# Patient Record
Sex: MALE | Race: WHITE | NOT HISPANIC OR LATINO | Employment: UNEMPLOYED | URBAN - NONMETROPOLITAN AREA
[De-identification: names, ages, dates, MRNs, and addresses within clinical notes are randomized per-mention and may not be internally consistent; named-entity substitution may affect disease eponyms.]

---

## 2018-06-07 ENCOUNTER — OFFICE VISIT (OUTPATIENT)
Dept: URGENT CARE | Facility: PHYSICIAN GROUP | Age: 53
End: 2018-06-07

## 2018-06-07 VITALS
DIASTOLIC BLOOD PRESSURE: 80 MMHG | OXYGEN SATURATION: 97 % | HEIGHT: 73 IN | WEIGHT: 206 LBS | TEMPERATURE: 97.7 F | BODY MASS INDEX: 27.3 KG/M2 | SYSTOLIC BLOOD PRESSURE: 128 MMHG | RESPIRATION RATE: 14 BRPM | HEART RATE: 72 BPM

## 2018-06-07 DIAGNOSIS — H69.92 ACUTE DYSFUNCTION OF LEFT EUSTACHIAN TUBE: ICD-10-CM

## 2018-06-07 PROCEDURE — 99203 OFFICE O/P NEW LOW 30 MIN: CPT | Performed by: FAMILY MEDICINE

## 2018-06-07 RX ORDER — PREDNISONE 20 MG/1
TABLET ORAL
Qty: 14 TAB | Refills: 0 | Status: SHIPPED | OUTPATIENT
Start: 2018-06-07 | End: 2020-08-12

## 2018-06-07 NOTE — PROGRESS NOTES
Chief Complaint:    Chief Complaint   Patient presents with   • Otalgia       History of Present Illness:    This is a new problem. For 1 month, he has fluctuating left ear discomfort. Ear can feel stuffy and sometimes pops. Sometimes feels he can hear fluid in left ear. Currently in room, left ear discomfort is mild. Occl feels dizzy. Has had some mild nasal symptoms. Sometimes has watery eyes. No purulent mucus from nose. No sore throat or cough. Nothing tried for this. No similar prior episodes.       Review of Systems:    Constitutional: Negative for fever, chills, and diaphoresis.   Eyes: See HPI.    ENT: See HPI.    Respiratory: Negative for cough, hemoptysis, sputum production, shortness of breath, wheezing, and stridor.    Cardiovascular: Negative for chest pain, palpitations, orthopnea, claudication, leg swelling, and PND.   Gastrointestinal: Negative for abdominal pain, nausea, vomiting, diarrhea, constipation, blood in stool, and melena.   Musculoskeletal: Negative for myalgias, joint pain, neck pain, and back pain.   Skin: Negative for rash and itching.   Neurological: See HPI.        Past Medical History:    No past medical history on file.    Past Surgical History:    No past surgical history on file.    Social History:    Social History     Social History   • Marital status:      Spouse name: N/A   • Number of children: N/A   • Years of education: N/A     Occupational History   • Not on file.     Social History Main Topics   • Smoking status: Current Every Day Smoker   • Smokeless tobacco: Former User     Types: Chew   • Alcohol use Not on file   • Drug use: Unknown   • Sexual activity: Not on file     Other Topics Concern   • Not on file     Social History Narrative   • No narrative on file     Family History:    No family history on file.    Medications:    No current outpatient prescriptions on file prior to visit.     No current facility-administered medications on file prior to visit.   "    Allergies:    No Known Allergies      Vitals:    Vitals:    06/07/18 0930   BP: 128/80   Pulse: 72   Resp: 14   Temp: 36.5 °C (97.7 °F)   SpO2: 97%   Weight: 93.4 kg (206 lb)   Height: 1.854 m (6' 1\")       Physical Exam:    Constitutional: Vital signs reviewed. Appears well-developed and well-nourished. No acute distress.   Eyes: Sclera white, conjunctivae clear. PERRLA.  ENT: External ears normal. External auditory canals normal without discharge. TMs translucent and non-bulging. Hearing normal. Nasal mucosa pink. Lips/teeth are normal. Oral mucosa pink and moist. Posterior pharynx: WNL.  Cardiovascular: Regular rate and rhythm. No murmur.  Pulmonary/Chest: Respirations non-labored. Clear to auscultation bilaterally.  Musculoskeletal: Normal gait. Normal range of motion. No tenderness to palpation. No muscular atrophy or weakness.  Neurological: Alert and oriented to person, place, and time. CN 2-12 intact. Muscle tone normal. Coordination normal.   Skin: No rashes or lesions. Warm, dry, normal turgor.      Assessment / Plan:    1. Acute dysfunction of left eustachian tube  - predniSONE (DELTASONE) 20 MG Tab; 1 OR 2 TABS A DAY ONLY IF NEEDED FOR LEFT EAR DISCOMFORT. TAKE WITH FOOD.  Dispense: 14 Tab; Refill: 0      Discussed with him DDX and management options.    Agreeable to medication prescribed.    Follow-up with PCP or urgent care if getting worse or not better with above.  "

## 2020-04-04 ENCOUNTER — OFFICE VISIT (OUTPATIENT)
Dept: URGENT CARE | Facility: PHYSICIAN GROUP | Age: 55
End: 2020-04-04
Payer: MEDICAID

## 2020-04-04 VITALS
HEIGHT: 73 IN | BODY MASS INDEX: 25.18 KG/M2 | RESPIRATION RATE: 16 BRPM | HEART RATE: 79 BPM | SYSTOLIC BLOOD PRESSURE: 126 MMHG | DIASTOLIC BLOOD PRESSURE: 82 MMHG | OXYGEN SATURATION: 96 % | WEIGHT: 190 LBS | TEMPERATURE: 97.4 F

## 2020-04-04 DIAGNOSIS — K57.92 DIVERTICULITIS: ICD-10-CM

## 2020-04-04 PROCEDURE — 99214 OFFICE O/P EST MOD 30 MIN: CPT | Performed by: PHYSICIAN ASSISTANT

## 2020-04-04 RX ORDER — ERGOCALCIFEROL 1.25 MG/1
CAPSULE ORAL
COMMUNITY
Start: 2020-01-19 | End: 2020-08-12

## 2020-04-04 RX ORDER — CETIRIZINE HYDROCHLORIDE 10 MG/1
TABLET ORAL PRN
COMMUNITY
Start: 2020-03-27 | End: 2020-08-24

## 2020-04-04 RX ORDER — FLUTICASONE PROPIONATE 50 MCG
SPRAY, SUSPENSION (ML) NASAL
COMMUNITY
Start: 2020-03-27 | End: 2020-08-12

## 2020-04-04 RX ORDER — AMOXICILLIN AND CLAVULANATE POTASSIUM 875; 125 MG/1; MG/1
1 TABLET, FILM COATED ORAL 2 TIMES DAILY
Qty: 20 TAB | Refills: 0 | Status: SHIPPED | OUTPATIENT
Start: 2020-04-04 | End: 2020-04-14

## 2020-04-04 RX ORDER — METHYLPREDNISOLONE 4 MG/1
TABLET ORAL
COMMUNITY
Start: 2020-03-27 | End: 2020-08-12

## 2020-04-04 NOTE — PROGRESS NOTES
Chief Complaint   Patient presents with   • Abdominal Pain       HISTORY OF PRESENT ILLNESS: Patient is a 54 y.o. male who presents today because he feels he is having another bout of diverticulitis.  He has had diverticulitis in the past and this feels similar.  He has a 4-day history of diarrhea, worsening every day, has had some mucus in his stool and he has left lower quadrant pain.  He has not been taking any medications for his current symptoms    There are no active problems to display for this patient.      Allergies:Patient has no known allergies.    Current Outpatient Medications Ordered in Epic   Medication Sig Dispense Refill   • amoxicillin-clavulanate (AUGMENTIN) 875-125 MG Tab Take 1 Tab by mouth 2 times a day for 10 days. 20 Tab 0   • cetirizine (ZYRTEC) 10 MG Tab TAKE 1 TABLET BY MOUTH ONCE DAILY FOR 30 DAYS     • methylPREDNISolone (MEDROL DOSEPAK) 4 MG Tablet Therapy Pack TAKE BY MOUTH AS DIRECTED ON INSIDE OF PACKAGE     • fluticasone (FLONASE) 50 MCG/ACT nasal spray USE 2 SPRAY(S) IN EACH NOSTRIL IN THE MORNING FOR 30 DAYS     • vitamin D, Ergocalciferol, (DRISDOL) 1.25 MG (47025 UT) Cap capsule TAKE 1 CAPSULE BY MOUTH ONCE A WEEK     • predniSONE (DELTASONE) 20 MG Tab 1 OR 2 TABS A DAY ONLY IF NEEDED FOR LEFT EAR DISCOMFORT. TAKE WITH FOOD. (Patient not taking: Reported on 4/4/2020) 14 Tab 0     No current Epic-ordered facility-administered medications on file.        History reviewed. No pertinent past medical history.    Social History     Tobacco Use   • Smoking status: Current Every Day Smoker   • Smokeless tobacco: Former User     Types: Chew   Substance Use Topics   • Alcohol use: Not on file   • Drug use: Not on file       No family status information on file.   History reviewed. No pertinent family history.    ROS:  Review of Systems   Constitutional: Negative for fever, chills, weight loss and malaise/fatigue.   HENT: Negative for ear pain, nosebleeds, congestion, sore throat and neck  "pain.    Eyes: Negative for blurred vision.   Respiratory: Negative for cough, sputum production, shortness of breath and wheezing.    Cardiovascular: Negative for chest pain, palpitations, orthopnea and leg swelling.   Gastrointestinal: Negative for heartburn, nausea, vomiting and positive for diarrhea and left lower abdominal pain.   Genitourinary: Negative for dysuria, urgency and frequency.     Exam:  /82   Pulse 79   Temp 36.3 °C (97.4 °F) (Temporal)   Resp 16   Ht 1.854 m (6' 1\")   Wt 86.2 kg (190 lb)   SpO2 96%   General:  Well nourished, well developed male in NAD  Head:Normocephalic, atraumatic  Eyes: PERRLA, EOM within normal limits, no conjunctival injection, no scleral icterus, visual fields and acuity grossly intact.  Nose: Symmetrical without tenderness, no discharge.  Mouth: reasonable hygiene, no erythema exudates or tonsillar enlargement.  Neck: no masses, range of motion within normal limits, no tracheal deviation. No obvious thyroid enlargement.  Abdomen: Nondistended, bowel tones all 4 quadrants, soft, he has left lower quadrant tenderness to palpation, no other tenderness to palpation, no organomegaly, no rebound referred rebound tenderness, no Mcgowan's or McBurney's point tenderness.  Extremities: no clubbing, cyanosis, or edema.    Please note that this dictation was created using voice recognition software. I have made every reasonable attempt to correct obvious errors, but I expect that there are errors of grammar and possibly content that I did not discover before finalizing the note.    Assessment/Plan:  1. Diverticulitis  amoxicillin-clavulanate (AUGMENTIN) 875-125 MG Tab       Followup with primary care in the next 7-10 days if not significantly improving, return to the urgent care or go to the emergency room sooner for any worsening of symptoms.       "

## 2020-04-18 ENCOUNTER — SUPERVISING PHYSICIAN REVIEW (OUTPATIENT)
Dept: URGENT CARE | Facility: PHYSICIAN GROUP | Age: 55
End: 2020-04-18

## 2020-07-25 ENCOUNTER — APPOINTMENT (OUTPATIENT)
Dept: RADIOLOGY | Facility: MEDICAL CENTER | Age: 55
End: 2020-07-25
Attending: STUDENT IN AN ORGANIZED HEALTH CARE EDUCATION/TRAINING PROGRAM
Payer: MEDICAID

## 2020-07-25 ENCOUNTER — HOSPITAL ENCOUNTER (EMERGENCY)
Facility: MEDICAL CENTER | Age: 55
End: 2020-07-25
Attending: EMERGENCY MEDICINE
Payer: MEDICAID

## 2020-07-25 ENCOUNTER — OFFICE VISIT (OUTPATIENT)
Dept: URGENT CARE | Facility: PHYSICIAN GROUP | Age: 55
End: 2020-07-25
Payer: MEDICAID

## 2020-07-25 VITALS
HEART RATE: 96 BPM | DIASTOLIC BLOOD PRESSURE: 84 MMHG | OXYGEN SATURATION: 99 % | RESPIRATION RATE: 16 BRPM | HEIGHT: 73 IN | WEIGHT: 190 LBS | TEMPERATURE: 98.5 F | BODY MASS INDEX: 25.18 KG/M2 | SYSTOLIC BLOOD PRESSURE: 128 MMHG

## 2020-07-25 VITALS
SYSTOLIC BLOOD PRESSURE: 125 MMHG | TEMPERATURE: 98.5 F | HEIGHT: 73 IN | RESPIRATION RATE: 18 BRPM | HEART RATE: 61 BPM | DIASTOLIC BLOOD PRESSURE: 70 MMHG | BODY MASS INDEX: 25.33 KG/M2 | WEIGHT: 191.14 LBS | OXYGEN SATURATION: 96 %

## 2020-07-25 DIAGNOSIS — R42 LIGHTHEADEDNESS: ICD-10-CM

## 2020-07-25 DIAGNOSIS — R42 DIZZINESS: ICD-10-CM

## 2020-07-25 LAB
ALBUMIN SERPL BCP-MCNC: 4.6 G/DL (ref 3.2–4.9)
ALBUMIN/GLOB SERPL: 2.2 G/DL
ALP SERPL-CCNC: 69 U/L (ref 30–99)
ALT SERPL-CCNC: 19 U/L (ref 2–50)
ANION GAP SERPL CALC-SCNC: 12 MMOL/L (ref 7–16)
AST SERPL-CCNC: 23 U/L (ref 12–45)
BASOPHILS # BLD AUTO: 0.8 % (ref 0–1.8)
BASOPHILS # BLD: 0.04 K/UL (ref 0–0.12)
BILIRUB SERPL-MCNC: 0.4 MG/DL (ref 0.1–1.5)
BUN SERPL-MCNC: 14 MG/DL (ref 8–22)
CALCIUM SERPL-MCNC: 8.9 MG/DL (ref 8.5–10.5)
CHLORIDE SERPL-SCNC: 103 MMOL/L (ref 96–112)
CO2 SERPL-SCNC: 23 MMOL/L (ref 20–33)
CREAT SERPL-MCNC: 0.97 MG/DL (ref 0.5–1.4)
EKG IMPRESSION: NORMAL
EOSINOPHIL # BLD AUTO: 0.1 K/UL (ref 0–0.51)
EOSINOPHIL NFR BLD: 1.9 % (ref 0–6.9)
ERYTHROCYTE [DISTWIDTH] IN BLOOD BY AUTOMATED COUNT: 51.1 FL (ref 35.9–50)
GLOBULIN SER CALC-MCNC: 2.1 G/DL (ref 1.9–3.5)
GLUCOSE SERPL-MCNC: 68 MG/DL (ref 65–99)
HCT VFR BLD AUTO: 47.3 % (ref 42–52)
HGB BLD-MCNC: 15.6 G/DL (ref 14–18)
IMM GRANULOCYTES # BLD AUTO: 0.02 K/UL (ref 0–0.11)
IMM GRANULOCYTES NFR BLD AUTO: 0.4 % (ref 0–0.9)
LYMPHOCYTES # BLD AUTO: 1.41 K/UL (ref 1–4.8)
LYMPHOCYTES NFR BLD: 26.8 % (ref 22–41)
MCH RBC QN AUTO: 32 PG (ref 27–33)
MCHC RBC AUTO-ENTMCNC: 33 G/DL (ref 33.7–35.3)
MCV RBC AUTO: 96.9 FL (ref 81.4–97.8)
MONOCYTES # BLD AUTO: 0.43 K/UL (ref 0–0.85)
MONOCYTES NFR BLD AUTO: 8.2 % (ref 0–13.4)
NEUTROPHILS # BLD AUTO: 3.27 K/UL (ref 1.82–7.42)
NEUTROPHILS NFR BLD: 61.9 % (ref 44–72)
NRBC # BLD AUTO: 0 K/UL
NRBC BLD-RTO: 0 /100 WBC
PLATELET # BLD AUTO: 217 K/UL (ref 164–446)
PMV BLD AUTO: 9.9 FL (ref 9–12.9)
POTASSIUM SERPL-SCNC: 4.3 MMOL/L (ref 3.6–5.5)
PROT SERPL-MCNC: 6.7 G/DL (ref 6–8.2)
RBC # BLD AUTO: 4.88 M/UL (ref 4.7–6.1)
SODIUM SERPL-SCNC: 138 MMOL/L (ref 135–145)
WBC # BLD AUTO: 5.3 K/UL (ref 4.8–10.8)

## 2020-07-25 PROCEDURE — 99999 PR NO CHARGE: CPT | Performed by: NURSE PRACTITIONER

## 2020-07-25 PROCEDURE — 99284 EMERGENCY DEPT VISIT MOD MDM: CPT

## 2020-07-25 PROCEDURE — 93005 ELECTROCARDIOGRAM TRACING: CPT | Performed by: EMERGENCY MEDICINE

## 2020-07-25 PROCEDURE — 71045 X-RAY EXAM CHEST 1 VIEW: CPT

## 2020-07-25 PROCEDURE — 85025 COMPLETE CBC W/AUTO DIFF WBC: CPT

## 2020-07-25 PROCEDURE — 70450 CT HEAD/BRAIN W/O DYE: CPT

## 2020-07-25 PROCEDURE — 80053 COMPREHEN METABOLIC PANEL: CPT

## 2020-07-25 PROCEDURE — 93005 ELECTROCARDIOGRAM TRACING: CPT

## 2020-07-25 SDOH — HEALTH STABILITY: MENTAL HEALTH: HOW OFTEN DO YOU HAVE A DRINK CONTAINING ALCOHOL?: NEVER

## 2020-07-25 NOTE — PROGRESS NOTES
Patient presents with complaint of intermittent lightheadedness over the last week.  States he has noted an intermittently elevated blood pressure as well.  No chest pain or shortness of breath at this time.  Vital signs are within normal limits.  Based on patient's symptoms after discussion, he will go on to emergency room for further evaluation and higher level of care.
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
## 2020-07-26 NOTE — DISCHARGE INSTRUCTIONS
We could not find a dangerous cause of your symptoms.  Rest and drink plenty of fluids.  [facility] to check blood pressure when you are well.  Return for ill appearance, loss of consciousness especially if with severe headache, chest pain, palpitations or leg swelling.

## 2020-07-26 NOTE — ED NOTES
Pt discharged. Pt provided information about dizziness. Pt educated to follow-up w/ PCP and to return to the hospital with any worsening symptoms. Pt walked to the lobby with his wife.

## 2020-08-04 ENCOUNTER — APPOINTMENT (OUTPATIENT)
Dept: RADIOLOGY | Facility: MEDICAL CENTER | Age: 55
End: 2020-08-04
Attending: EMERGENCY MEDICINE
Payer: MEDICAID

## 2020-08-04 ENCOUNTER — HOSPITAL ENCOUNTER (EMERGENCY)
Facility: MEDICAL CENTER | Age: 55
End: 2020-08-04
Attending: EMERGENCY MEDICINE
Payer: MEDICAID

## 2020-08-04 VITALS
OXYGEN SATURATION: 97 % | DIASTOLIC BLOOD PRESSURE: 89 MMHG | RESPIRATION RATE: 19 BRPM | BODY MASS INDEX: 25.3 KG/M2 | TEMPERATURE: 98.8 F | HEART RATE: 67 BPM | WEIGHT: 190.92 LBS | HEIGHT: 73 IN | SYSTOLIC BLOOD PRESSURE: 164 MMHG

## 2020-08-04 DIAGNOSIS — R42 LIGHTHEADEDNESS: ICD-10-CM

## 2020-08-04 DIAGNOSIS — I67.1 CEREBRAL ANEURYSM: ICD-10-CM

## 2020-08-04 LAB
ALBUMIN SERPL BCP-MCNC: 4.6 G/DL (ref 3.2–4.9)
ALBUMIN/GLOB SERPL: 2.3 G/DL
ALP SERPL-CCNC: 71 U/L (ref 30–99)
ALT SERPL-CCNC: 17 U/L (ref 2–50)
ANION GAP SERPL CALC-SCNC: 12 MMOL/L (ref 7–16)
AST SERPL-CCNC: 13 U/L (ref 12–45)
BASOPHILS # BLD AUTO: 0.4 % (ref 0–1.8)
BASOPHILS # BLD: 0.03 K/UL (ref 0–0.12)
BILIRUB SERPL-MCNC: 0.4 MG/DL (ref 0.1–1.5)
BUN SERPL-MCNC: 9 MG/DL (ref 8–22)
CALCIUM SERPL-MCNC: 9 MG/DL (ref 8.5–10.5)
CHLORIDE SERPL-SCNC: 105 MMOL/L (ref 96–112)
CO2 SERPL-SCNC: 25 MMOL/L (ref 20–33)
CREAT SERPL-MCNC: 0.94 MG/DL (ref 0.5–1.4)
EKG IMPRESSION: NORMAL
EOSINOPHIL # BLD AUTO: 0.12 K/UL (ref 0–0.51)
EOSINOPHIL NFR BLD: 1.5 % (ref 0–6.9)
ERYTHROCYTE [DISTWIDTH] IN BLOOD BY AUTOMATED COUNT: 50.5 FL (ref 35.9–50)
GLOBULIN SER CALC-MCNC: 2 G/DL (ref 1.9–3.5)
GLUCOSE SERPL-MCNC: 102 MG/DL (ref 65–99)
HCT VFR BLD AUTO: 46.1 % (ref 42–52)
HGB BLD-MCNC: 15.3 G/DL (ref 14–18)
IMM GRANULOCYTES # BLD AUTO: 0.02 K/UL (ref 0–0.11)
IMM GRANULOCYTES NFR BLD AUTO: 0.3 % (ref 0–0.9)
LYMPHOCYTES # BLD AUTO: 1.64 K/UL (ref 1–4.8)
LYMPHOCYTES NFR BLD: 20.8 % (ref 22–41)
MCH RBC QN AUTO: 32.2 PG (ref 27–33)
MCHC RBC AUTO-ENTMCNC: 33.2 G/DL (ref 33.7–35.3)
MCV RBC AUTO: 97.1 FL (ref 81.4–97.8)
MONOCYTES # BLD AUTO: 0.59 K/UL (ref 0–0.85)
MONOCYTES NFR BLD AUTO: 7.5 % (ref 0–13.4)
NEUTROPHILS # BLD AUTO: 5.48 K/UL (ref 1.82–7.42)
NEUTROPHILS NFR BLD: 69.5 % (ref 44–72)
NRBC # BLD AUTO: 0 K/UL
NRBC BLD-RTO: 0 /100 WBC
PLATELET # BLD AUTO: 209 K/UL (ref 164–446)
PMV BLD AUTO: 9.7 FL (ref 9–12.9)
POTASSIUM SERPL-SCNC: 3.8 MMOL/L (ref 3.6–5.5)
PROT SERPL-MCNC: 6.6 G/DL (ref 6–8.2)
RBC # BLD AUTO: 4.75 M/UL (ref 4.7–6.1)
SODIUM SERPL-SCNC: 142 MMOL/L (ref 135–145)
T4 FREE SERPL-MCNC: 1.42 NG/DL (ref 0.93–1.7)
TROPONIN T SERPL-MCNC: 7 NG/L (ref 6–19)
TSH SERPL DL<=0.005 MIU/L-ACNC: 6.22 UIU/ML (ref 0.38–5.33)
WBC # BLD AUTO: 7.9 K/UL (ref 4.8–10.8)

## 2020-08-04 PROCEDURE — 84439 ASSAY OF FREE THYROXINE: CPT

## 2020-08-04 PROCEDURE — 70496 CT ANGIOGRAPHY HEAD: CPT

## 2020-08-04 PROCEDURE — 700117 HCHG RX CONTRAST REV CODE 255: Performed by: EMERGENCY MEDICINE

## 2020-08-04 PROCEDURE — 84484 ASSAY OF TROPONIN QUANT: CPT

## 2020-08-04 PROCEDURE — 80053 COMPREHEN METABOLIC PANEL: CPT

## 2020-08-04 PROCEDURE — 99284 EMERGENCY DEPT VISIT MOD MDM: CPT

## 2020-08-04 PROCEDURE — 70498 CT ANGIOGRAPHY NECK: CPT

## 2020-08-04 PROCEDURE — 84443 ASSAY THYROID STIM HORMONE: CPT

## 2020-08-04 PROCEDURE — 85025 COMPLETE CBC W/AUTO DIFF WBC: CPT

## 2020-08-04 PROCEDURE — 93005 ELECTROCARDIOGRAM TRACING: CPT

## 2020-08-04 PROCEDURE — 93005 ELECTROCARDIOGRAM TRACING: CPT | Performed by: EMERGENCY MEDICINE

## 2020-08-04 RX ORDER — LISINOPRIL 10 MG/1
10 TABLET ORAL DAILY
Qty: 30 TAB | Refills: 0 | Status: SHIPPED | OUTPATIENT
Start: 2020-08-04

## 2020-08-04 RX ORDER — LISINOPRIL 10 MG/1
10 TABLET ORAL DAILY
Qty: 30 TAB | Refills: 0 | Status: SHIPPED | OUTPATIENT
Start: 2020-08-04 | End: 2020-08-04 | Stop reason: SDUPTHER

## 2020-08-04 RX ADMIN — IOHEXOL 80 ML: 350 INJECTION, SOLUTION INTRAVENOUS at 22:26

## 2020-08-04 ASSESSMENT — FIBROSIS 4 INDEX: FIB4 SCORE: 1.36

## 2020-08-05 DIAGNOSIS — I67.1 CEREBRAL ANEURYSM: Primary | ICD-10-CM
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
## 2020-08-05 NOTE — DISCHARGE PLANNING
note:  Received a call from pt because he is confused as to who he needs to follow up with.   Clarified that pt needs to follow up with neuroradiologist Dr. Cohen. Instructed pt to call [phone] for an appt with MD.

## 2020-08-05 NOTE — ED TRIAGE NOTES
"Wayne Klein   55 y.o. male   Chief Complaint   Patient presents with   • Dizziness     was seen last week for elevated BP and dizziness, not improving. denies any CP or SOB      Pt amb to triage with steady gait for above complaint.   Pt is alert and oriented, speaking in full sentences, follows commands and responds appropriately to questions. NAD. Resp are even and unlabored.   Pt placed in lobby. Pt educated on triage process. Pt encouraged to alert staff for any changes.    /87   Pulse 77   Temp 36 °C (96.8 °F) (Temporal)   Resp 16   Ht 1.854 m (6' 1\")   Wt 86.6 kg (190 lb 14.7 oz)   SpO2 96%   BMI 25.19 kg/m²     "

## 2020-08-10 NOTE — CONSULTS
Neuro Interventional Service Consultation      Re: Wayne Klein     MRN: [id number]   : 1965    Wayne Klein was referred to our service by Jaya Garcia MD.  He is a 55 y.o. male seen in clinic for evaluation and possible aneurysm intervention. He is also under the care of Corby Villavicencio DO.    History of Present Illness:   presented on  and again on  to the [facility] ED with complaints of lightheadedness and dizziness with standing and walking for several days. He also complained of a headache in his usual pattern but unrelieved with Aleve. He had no focal weakness or dysarthria. 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  He has been referred to the Neuro Interventional Service for evaluation and management of this finding.     He is seen today for review of imaging studies and discussion of possible aneurysm coiling. Today, the patient reports improvement in his presenting symptoms. He has ongoing headaches in the same pattern for many years, which he attributes to neck and back problems. He denies symptoms of sentinel headache. There is a significant family history of aneurysms with his mother an paternal uncle suffering intracranial aneurysms and aortic aneurysms in his father, brother, and sister. Blood pressure is poorly controlled with medications but he is actively working with his PCP for better management. He monitors his BP at home daily. He smokes 1-2 cigarettes a day, which is improved from a pack per day for many years. He has not had alcohol in about 18 years. He denies current or past methamphetamine use. He is accompanied by his wife to the appointment.     No past medical history on file.  Past Surgical History:   Procedure Laterality Date   • BOWEL RESECTION       Social History     Socioeconomic History   • Marital status:      Spouse name: Not on file   • Number of  children: Not on file   • Years of education: Not on file   • Highest education level: Not on file   Occupational History   • Not on file   Social Needs   • Financial resource strain: Not on file   • Food insecurity     Worry: Not on file     Inability: Not on file   • Transportation needs     Medical: Not on file     Non-medical: Not on file   Tobacco Use   • Smoking status: Current Every Day Smoker     Packs/day: 0.50     Types: Cigarettes   • Smokeless tobacco: Former User     Types: Chew   Substance and Sexual Activity   • Alcohol use: Never     Frequency: Never   • Drug use: Never   • Sexual activity: Not on file   Lifestyle   • Physical activity     Days per week: Not on file     Minutes per session: Not on file   • Stress: Not on file   Relationships   • Social connections     Talks on phone: Not on file     Gets together: Not on file     Attends Spiritism service: Not on file     Active member of club or organization: Not on file     Attends meetings of clubs or organizations: Not on file     Relationship status: Not on file   • Intimate partner violence     Fear of current or ex partner: Not on file     Emotionally abused: Not on file     Physically abused: Not on file     Forced sexual activity: Not on file   Other Topics Concern   • Not on file   Social History Narrative   • Not on file     No family history on file.    Review of Systems   Constitutional: Negative.  Negative for chills, diaphoresis, fever, malaise/fatigue and weight loss.   HENT: Negative for nosebleeds.    Respiratory: Negative.  Negative for hemoptysis.    Cardiovascular: Negative.    Gastrointestinal: Negative.  Negative for blood in stool and melena.   Genitourinary: Negative for hematuria.   Skin: Negative.    Neurological: Negative for sensory change, speech change, focal weakness and weakness.   Endo/Heme/Allergies: Does not bruise/bleed easily.   Psychiatric/Behavioral: Positive for substance abuse (negative for alcohol, positive  for tobacco with current use 1-2 cig/day).       A comprehensive 14-point review of systems was negative except as described above.     Labs:      Ref. Range 7/25/2020 17:00 8/4/2020 20:32   WBC Latest Ref Range: 4.8 - 10.8 K/uL 5.3 7.9   RBC Latest Ref Range: 4.70 - 6.10 M/uL 4.88 4.75   Hemoglobin Latest Ref Range: 14.0 - 18.0 g/dL 15.6 15.3   Hematocrit Latest Ref Range: 42.0 - 52.0 % 47.3 46.1   MCV Latest Ref Range: 81.4 - 97.8 fL 96.9 97.1   MCH Latest Ref Range: 27.0 - 33.0 pg 32.0 32.2   MCHC Latest Ref Range: 33.7 - 35.3 g/dL 33.0 (L) 33.2 (L)   RDW Latest Ref Range: 35.9 - 50.0 fL 51.1 (H) 50.5 (H)   Platelet Count Latest Ref Range: 164 - 446 K/uL 217 209   MPV Latest Ref Range: 9.0 - 12.9 fL 9.9 9.7   Neutrophils-Polys Latest Ref Range: 44.00 - 72.00 % 61.90 69.50   Neutrophils (Absolute) Latest Ref Range: 1.82 - 7.42 K/uL 3.27 5.48   Lymphocytes Latest Ref Range: 22.00 - 41.00 % 26.80 20.80 (L)   Lymphs (Absolute) Latest Ref Range: 1.00 - 4.80 K/uL 1.41 1.64   Monocytes Latest Ref Range: 0.00 - 13.40 % 8.20 7.50   Monos (Absolute) Latest Ref Range: 0.00 - 0.85 K/uL 0.43 0.59   Eosinophils Latest Ref Range: 0.00 - 6.90 % 1.90 1.50   Eos (Absolute) Latest Ref Range: 0.00 - 0.51 K/uL 0.10 0.12   Basophils Latest Ref Range: 0.00 - 1.80 % 0.80 0.40   Baso (Absolute) Latest Ref Range: 0.00 - 0.12 K/uL 0.04 0.03   Immature Granulocytes Latest Ref Range: 0.00 - 0.90 % 0.40 0.30   Immature Granulocytes (abs) Latest Ref Range: 0.00 - 0.11 K/uL 0.02 0.02   Nucleated RBC Latest Units: /100 WBC 0.00 0.00   NRBC (Absolute) Latest Units: K/uL 0.00 0.00   Sodium Latest Ref Range: 135 - 145 mmol/L 138 142   Potassium Latest Ref Range: 3.6 - 5.5 mmol/L 4.3 3.8   Chloride Latest Ref Range: 96 - 112 mmol/L 103 105   Co2 Latest Ref Range: 20 - 33 mmol/L 23 25   Anion Gap Latest Ref Range: 7.0 - 16.0  12.0 12.0   Glucose Latest Ref Range: 65 - 99 mg/dL 68 102 (H)   Bun Latest Ref Range: 8 - 22 mg/dL 14 9   Creatinine Latest  Ref Range: 0.50 - 1.40 mg/dL 0.97 0.94   GFR If  Latest Ref Range: >60 mL/min/1.73 m 2 >60 >60   GFR If Non  Latest Ref Range: >60 mL/min/1.73 m 2 >60 >60   Calcium Latest Ref Range: 8.5 - 10.5 mg/dL 8.9 9.0   AST(SGOT) Latest Ref Range: 12 - 45 U/L 23 13   ALT(SGPT) Latest Ref Range: 2 - 50 U/L 19 17   Alkaline Phosphatase Latest Ref Range: 30 - 99 U/L 69 71   Total Bilirubin Latest Ref Range: 0.1 - 1.5 mg/dL 0.4 0.4   Albumin Latest Ref Range: 3.2 - 4.9 g/dL 4.6 4.6   Total Protein Latest Ref Range: 6.0 - 8.2 g/dL 6.7 6.6   Globulin Latest Ref Range: 1.9 - 3.5 g/dL 2.1 2.0   A-G Ratio Latest Units: g/dL 2.2 2.3   Troponin T Latest Ref Range: 6 - 19 ng/L  7   TSH Latest Ref Range: 0.380 - 5.330 uIU/mL  6.220 (H)   Free T-4 Latest Ref Range: 0.93 - 1.70 ng/dL  1.42       Radiology:   CTA head on August 4, 2020 at [facility]:  1.  No large vessel occlusion identified.  2.  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      CTA neck August 4, 2020 at [facility]:  1.  CT angiogram of the neck within normal limits.    CT head w/o July 25, 2020 at [facility]:  1.  Head CT without contrast within normal limits. No evidence of acute cerebral infarction, hemorrhage or mass lesion.    Current Outpatient Medications   Medication Sig Dispense Refill   • lisinopril (PRINIVIL) 10 MG Tab Take 1 Tab by mouth every day. 30 Tab 0   • cetirizine (ZYRTEC) 10 MG Tab TAKE 1 TABLET BY MOUTH ONCE DAILY FOR 30 DAYS     • methylPREDNISolone (MEDROL DOSEPAK) 4 MG Tablet Therapy Pack TAKE BY MOUTH AS DIRECTED ON INSIDE OF PACKAGE     • fluticasone (FLONASE) 50 MCG/ACT nasal spray USE 2 SPRAY(S) IN EACH NOSTRIL IN THE MORNING FOR 30 DAYS     • vitamin D, Ergocalciferol, (DRISDOL) 1.25 MG (72486 UT) Cap capsule TAKE 1 CAPSULE BY MOUTH ONCE A WEEK     • predniSONE (DELTASONE) 20 MG Tab 1 OR 2 TABS A DAY ONLY IF NEEDED FOR LEFT EAR  DISCOMFORT. TAKE WITH FOOD. (Patient not taking: Reported on 4/4/2020) 14 Tab 0     No current facility-administered medications for this encounter.        No Known Allergies    Physical Exam   Constitutional: He is oriented to person, place, and time and well-developed, well-nourished, and in no distress. No distress.   HENT:   Head: Normocephalic.   Eyes: No scleral icterus.   Pulmonary/Chest: Effort normal. No respiratory distress.   Abdominal: He exhibits no distension.   Neurological: He is alert and oriented to person, place, and time. He has normal sensation and normal strength. He is not agitated and not disoriented. He displays no weakness, no tremor, facial symmetry, normal stance and normal speech. No cranial nerve deficit. Gait normal. Coordination and gait normal.   Skin: Skin is warm and dry. No rash noted. He is not diaphoretic. No erythema. No pallor.   Psychiatric: Mood, memory, affect and judgment normal.     Impression:   1. 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  2. Hypertension, poorly controlled.  3. Tobacco dependence.    Plan:   Yahir Cohen MD has reviewed 's history and imaging studies, examined the patient, and discussed treatment options.  is a candidate for intervention of this incidental asymptomatic aneurysm. We explained that his symptoms of dizziness are unlikely to be related to this aneurysm and could persist after intervention.. Aneurysms of this size and in this location carry a cumulative 5 year rupture risk of 3.4%; overall procedure risk is approximately 1-3%. We discussed the method of the procedure at length including the possibility of the use of stents or balloons to facilitate the procedure and associated risks of those devices. We additionally discussed the procedure risks, including bleeding and infection, damage to the arteries, reaction to any medications given during the procedure, side effects of contrast,  radiation exposure, in stent stenosis, coil or stent migration, mechanical failure, stroke, hemorrhage, and death. There is a chance the aneurysm may ultimately not be amenable to endovascular intervention. After the procedure, there is a chance that the aneurysm could recur. We explained that the patient will need to have ongoing surveillance imaging after the procedure, which will be managed by us, and that future treatment depends on multiple factors including lab studies, imaging, and performance status. We encouraged strict blood pressure control and complete tobacco cessation. We discussed alternatives of the procedure including surveillance and surgical intervention, which could be discussed with a neurosurgeon. The patient verbalizes understanding of risks, benefits, and alternatives and elects to proceed. We discussed the need for aspirin and Plavix prior to the procedure and for up to 6 months post procedure if a stent is placed. His pharmacy is [email or web address] in [place]. Side effects of antiplatelet therapy, specifically bleeding, were discussed with instructions given should the patient develop minor or major bleeding. Printed aneurysm education materials including stent information were provided. Written pre- and post- procedure care instructions were provided. We discussed signs of a sentinel headache and stroke with instructions to call an ambulance for the onset of these symptoms. The patient will confer with his family and contact us to schedule.      FLAVIO Zarate with Yahir Cohen MD  Neuro Interventional Service   [place] [id number] [facility] ([id number])  [place] [address]  [phone]

## 2020-08-11 ENCOUNTER — HOSPITAL ENCOUNTER (OUTPATIENT)
Dept: RADIOLOGY | Facility: MEDICAL CENTER | Age: 55
End: 2020-08-11
Attending: EMERGENCY MEDICINE
Payer: MEDICAID

## 2020-08-11 DIAGNOSIS — I67.1 CEREBRAL ANEURYSM: ICD-10-CM

## 2020-08-11 RX ORDER — CLOPIDOGREL BISULFATE 75 MG/1
75 TABLET ORAL DAILY
Qty: 30 TAB | Refills: 5 | Status: ON HOLD | OUTPATIENT
Start: 2020-08-15 | End: 2020-08-20

## 2020-08-11 ASSESSMENT — ENCOUNTER SYMPTOMS
RESPIRATORY NEGATIVE: 1
WEIGHT LOSS: 0
SENSORY CHANGE: 0
CHILLS: 0
HEMOPTYSIS: 0
FOCAL WEAKNESS: 0
BRUISES/BLEEDS EASILY: 0
SPEECH CHANGE: 0
WEAKNESS: 0
GASTROINTESTINAL NEGATIVE: 1
BLOOD IN STOOL: 0
CARDIOVASCULAR NEGATIVE: 1
DIAPHORESIS: 0
CONSTITUTIONAL NEGATIVE: 1
FEVER: 0

## 2020-08-11 ASSESSMENT — LIFESTYLE VARIABLES: SUBSTANCE_ABUSE: 1

## 2020-08-12 DIAGNOSIS — Z01.812 PRE-OPERATIVE LABORATORY EXAMINATION: ICD-10-CM

## 2020-08-12 LAB
COVID ORDER STATUS COVID19: NORMAL
ERYTHROCYTE [DISTWIDTH] IN BLOOD BY AUTOMATED COUNT: 51 FL (ref 35.9–50)
HCT VFR BLD AUTO: 50.4 % (ref 42–52)
HGB BLD-MCNC: 16.9 G/DL (ref 14–18)
INR PPP: 1.03 (ref 0.87–1.13)
MCH RBC QN AUTO: 32.4 PG (ref 27–33)
MCHC RBC AUTO-ENTMCNC: 33.5 G/DL (ref 33.7–35.3)
MCV RBC AUTO: 96.6 FL (ref 81.4–97.8)
PLATELET # BLD AUTO: 230 K/UL (ref 164–446)
PMV BLD AUTO: 9.8 FL (ref 9–12.9)
PROTHROMBIN TIME: 13.8 SEC (ref 12–14.6)
RBC # BLD AUTO: 5.22 M/UL (ref 4.7–6.1)
SARS-COV-2 RNA RESP QL NAA+PROBE: NOTDETECTED
SPECIMEN SOURCE: NORMAL
WBC # BLD AUTO: 7.3 K/UL (ref 4.8–10.8)

## 2020-08-12 PROCEDURE — U0003 INFECTIOUS AGENT DETECTION BY NUCLEIC ACID (DNA OR RNA); SEVERE ACUTE RESPIRATORY SYNDROME CORONAVIRUS 2 (SARS-COV-2) (CORONAVIRUS DISEASE [COVID-19]), AMPLIFIED PROBE TECHNIQUE, MAKING USE OF HIGH THROUGHPUT TECHNOLOGIES AS DESCRIBED BY CMS-2020-01-R: HCPCS

## 2020-08-12 PROCEDURE — 85027 COMPLETE CBC AUTOMATED: CPT

## 2020-08-12 PROCEDURE — 36415 COLL VENOUS BLD VENIPUNCTURE: CPT

## 2020-08-12 PROCEDURE — 85610 PROTHROMBIN TIME: CPT

## 2020-08-12 RX ORDER — ACETAMINOPHEN 325 MG/1
650 TABLET ORAL EVERY 4 HOURS PRN
Status: ON HOLD | COMMUNITY
End: 2020-08-25

## 2020-08-12 RX ORDER — IBUPROFEN 200 MG
200 TABLET ORAL EVERY 6 HOURS PRN
Status: ON HOLD | COMMUNITY
End: 2020-08-20

## 2020-08-12 RX ORDER — ASPIRIN 81 MG/1
81 TABLET ORAL DAILY
COMMUNITY

## 2020-08-12 ASSESSMENT — FIBROSIS 4 INDEX: FIB4 SCORE: 0.84

## 2020-08-14 ENCOUNTER — APPOINTMENT (OUTPATIENT)
Dept: ADMISSIONS | Facility: MEDICAL CENTER | Age: 55
DRG: 272 | End: 2020-08-14
Attending: RADIOLOGY
Payer: MEDICAID

## 2020-08-19 ENCOUNTER — APPOINTMENT (OUTPATIENT)
Dept: RADIOLOGY | Facility: MEDICAL CENTER | Age: 55
DRG: 272 | End: 2020-08-19
Attending: RADIOLOGY
Payer: MEDICAID

## 2020-08-19 ENCOUNTER — HOSPITAL ENCOUNTER (INPATIENT)
Facility: MEDICAL CENTER | Age: 55
LOS: 2 days | DRG: 272 | End: 2020-08-21
Attending: RADIOLOGY | Admitting: RADIOLOGY
Payer: MEDICAID

## 2020-08-19 ENCOUNTER — ANESTHESIA EVENT (OUTPATIENT)
Dept: RADIOLOGY | Facility: MEDICAL CENTER | Age: 55
DRG: 272 | End: 2020-08-19
Payer: MEDICAID

## 2020-08-19 ENCOUNTER — ANESTHESIA (OUTPATIENT)
Dept: RADIOLOGY | Facility: MEDICAL CENTER | Age: 55
DRG: 272 | End: 2020-08-19
Payer: MEDICAID

## 2020-08-19 DIAGNOSIS — I72.9 ANEURYSM (HCC): ICD-10-CM

## 2020-08-19 DIAGNOSIS — I67.1 INTRACRANIAL ANEURYSM: ICD-10-CM

## 2020-08-19 PROBLEM — Z72.0 TOBACCO ABUSE: Status: ACTIVE | Noted: 2020-08-19

## 2020-08-19 PROBLEM — I10 HYPERTENSION: Status: ACTIVE | Noted: 2020-08-19

## 2020-08-19 LAB — ACT BLD: 202 SEC (ref 74–137)

## 2020-08-19 PROCEDURE — 160002 HCHG RECOVERY MINUTES (STAT)

## 2020-08-19 PROCEDURE — A9270 NON-COVERED ITEM OR SERVICE: HCPCS | Performed by: RADIOLOGY

## 2020-08-19 PROCEDURE — 700101 HCHG RX REV CODE 250: Performed by: ANESTHESIOLOGY

## 2020-08-19 PROCEDURE — 03LQ3DZ OCCLUSION OF LEFT VERTEBRAL ARTERY WITH INTRALUMINAL DEVICE, PERCUTANEOUS APPROACH: ICD-10-PCS | Performed by: RADIOLOGY

## 2020-08-19 PROCEDURE — 700102 HCHG RX REV CODE 250 W/ 637 OVERRIDE(OP): Performed by: RADIOLOGY

## 2020-08-19 PROCEDURE — 700111 HCHG RX REV CODE 636 W/ 250 OVERRIDE (IP)

## 2020-08-19 PROCEDURE — 99291 CRITICAL CARE FIRST HOUR: CPT | Performed by: INTERNAL MEDICINE

## 2020-08-19 PROCEDURE — 700111 HCHG RX REV CODE 636 W/ 250 OVERRIDE (IP): Performed by: ANESTHESIOLOGY

## 2020-08-19 PROCEDURE — 75894 X-RAYS TRANSCATH THERAPY: CPT

## 2020-08-19 PROCEDURE — 700117 HCHG RX CONTRAST REV CODE 255: Performed by: RADIOLOGY

## 2020-08-19 PROCEDURE — 770022 HCHG ROOM/CARE - ICU (200)

## 2020-08-19 PROCEDURE — B41F1ZZ FLUOROSCOPY OF RIGHT LOWER EXTREMITY ARTERIES USING LOW OSMOLAR CONTRAST: ICD-10-PCS | Performed by: RADIOLOGY

## 2020-08-19 PROCEDURE — 85347 COAGULATION TIME ACTIVATED: CPT

## 2020-08-19 PROCEDURE — B31RYZZ FLUOROSCOPY OF INTRACRANIAL ARTERIES USING OTHER CONTRAST: ICD-10-PCS | Performed by: RADIOLOGY

## 2020-08-19 PROCEDURE — 700105 HCHG RX REV CODE 258: Performed by: RADIOLOGY

## 2020-08-19 PROCEDURE — 700101 HCHG RX REV CODE 250

## 2020-08-19 RX ORDER — ASPIRIN 300 MG/1
300 SUPPOSITORY RECTAL DAILY
Status: DISCONTINUED | OUTPATIENT
Start: 2020-08-20 | End: 2020-08-19

## 2020-08-19 RX ORDER — HYDROMORPHONE HYDROCHLORIDE 1 MG/ML
0.2 INJECTION, SOLUTION INTRAMUSCULAR; INTRAVENOUS; SUBCUTANEOUS
Status: DISCONTINUED | OUTPATIENT
Start: 2020-08-19 | End: 2020-08-19 | Stop reason: HOSPADM

## 2020-08-19 RX ORDER — MEPERIDINE HYDROCHLORIDE 25 MG/ML
6.25 INJECTION INTRAMUSCULAR; INTRAVENOUS; SUBCUTANEOUS
Status: DISCONTINUED | OUTPATIENT
Start: 2020-08-19 | End: 2020-08-19 | Stop reason: HOSPADM

## 2020-08-19 RX ORDER — HEPARIN SODIUM,PORCINE 1000/ML
VIAL (ML) INJECTION PRN
Status: DISCONTINUED | OUTPATIENT
Start: 2020-08-19 | End: 2020-08-19 | Stop reason: SURG

## 2020-08-19 RX ORDER — LIDOCAINE HYDROCHLORIDE 40 MG/ML
SOLUTION TOPICAL PRN
Status: DISCONTINUED | OUTPATIENT
Start: 2020-08-19 | End: 2020-08-19 | Stop reason: SURG

## 2020-08-19 RX ORDER — OXYCODONE HCL 5 MG/5 ML
5 SOLUTION, ORAL ORAL
Status: DISCONTINUED | OUTPATIENT
Start: 2020-08-19 | End: 2020-08-19 | Stop reason: HOSPADM

## 2020-08-19 RX ORDER — HALOPERIDOL 5 MG/ML
1 INJECTION INTRAMUSCULAR
Status: DISCONTINUED | OUTPATIENT
Start: 2020-08-19 | End: 2020-08-19 | Stop reason: HOSPADM

## 2020-08-19 RX ORDER — LIDOCAINE HYDROCHLORIDE 40 MG/ML
SOLUTION TOPICAL
Status: COMPLETED
Start: 2020-08-19 | End: 2020-08-19

## 2020-08-19 RX ORDER — HEPARIN SODIUM,PORCINE 1000/ML
VIAL (ML) INJECTION
Status: COMPLETED
Start: 2020-08-19 | End: 2020-08-19

## 2020-08-19 RX ORDER — OXYCODONE HCL 5 MG/5 ML
10 SOLUTION, ORAL ORAL
Status: DISCONTINUED | OUTPATIENT
Start: 2020-08-19 | End: 2020-08-19 | Stop reason: HOSPADM

## 2020-08-19 RX ORDER — HYDROMORPHONE HYDROCHLORIDE 1 MG/ML
0.1 INJECTION, SOLUTION INTRAMUSCULAR; INTRAVENOUS; SUBCUTANEOUS
Status: DISCONTINUED | OUTPATIENT
Start: 2020-08-19 | End: 2020-08-19 | Stop reason: HOSPADM

## 2020-08-19 RX ORDER — HYDRALAZINE HYDROCHLORIDE 20 MG/ML
10 INJECTION INTRAMUSCULAR; INTRAVENOUS
Status: DISCONTINUED | OUTPATIENT
Start: 2020-08-19 | End: 2020-08-21 | Stop reason: HOSPADM

## 2020-08-19 RX ORDER — ONDANSETRON 2 MG/ML
4 INJECTION INTRAMUSCULAR; INTRAVENOUS
Status: DISCONTINUED | OUTPATIENT
Start: 2020-08-19 | End: 2020-08-19 | Stop reason: HOSPADM

## 2020-08-19 RX ORDER — SODIUM CHLORIDE, SODIUM LACTATE, POTASSIUM CHLORIDE, CALCIUM CHLORIDE 600; 310; 30; 20 MG/100ML; MG/100ML; MG/100ML; MG/100ML
INJECTION, SOLUTION INTRAVENOUS CONTINUOUS
Status: ACTIVE | OUTPATIENT
Start: 2020-08-19 | End: 2020-08-19

## 2020-08-19 RX ORDER — SODIUM CHLORIDE, SODIUM LACTATE, POTASSIUM CHLORIDE, CALCIUM CHLORIDE 600; 310; 30; 20 MG/100ML; MG/100ML; MG/100ML; MG/100ML
INJECTION, SOLUTION INTRAVENOUS CONTINUOUS
Status: DISCONTINUED | OUTPATIENT
Start: 2020-08-19 | End: 2020-08-19 | Stop reason: HOSPADM

## 2020-08-19 RX ORDER — ASPIRIN 81 MG/1
324 TABLET, CHEWABLE ORAL DAILY
Status: DISCONTINUED | OUTPATIENT
Start: 2020-08-20 | End: 2020-08-19

## 2020-08-19 RX ORDER — CEFAZOLIN SODIUM 1 G/3ML
INJECTION, POWDER, FOR SOLUTION INTRAMUSCULAR; INTRAVENOUS PRN
Status: DISCONTINUED | OUTPATIENT
Start: 2020-08-19 | End: 2020-08-19 | Stop reason: SURG

## 2020-08-19 RX ORDER — DIPHENHYDRAMINE HYDROCHLORIDE 50 MG/ML
12.5 INJECTION INTRAMUSCULAR; INTRAVENOUS
Status: DISCONTINUED | OUTPATIENT
Start: 2020-08-19 | End: 2020-08-19 | Stop reason: HOSPADM

## 2020-08-19 RX ORDER — HYDROMORPHONE HYDROCHLORIDE 1 MG/ML
0.4 INJECTION, SOLUTION INTRAMUSCULAR; INTRAVENOUS; SUBCUTANEOUS
Status: DISCONTINUED | OUTPATIENT
Start: 2020-08-19 | End: 2020-08-19 | Stop reason: HOSPADM

## 2020-08-19 RX ORDER — NICOTINE 21 MG/24HR
21 PATCH, TRANSDERMAL 24 HOURS TRANSDERMAL
Status: DISCONTINUED | OUTPATIENT
Start: 2020-08-20 | End: 2020-08-21 | Stop reason: HOSPADM

## 2020-08-19 RX ORDER — LABETALOL HYDROCHLORIDE 5 MG/ML
10 INJECTION, SOLUTION INTRAVENOUS EVERY 4 HOURS PRN
Status: DISCONTINUED | OUTPATIENT
Start: 2020-08-19 | End: 2020-08-21 | Stop reason: HOSPADM

## 2020-08-19 RX ORDER — ASPIRIN 81 MG/1
81 TABLET, CHEWABLE ORAL DAILY
Status: DISCONTINUED | OUTPATIENT
Start: 2020-08-20 | End: 2020-08-21 | Stop reason: HOSPADM

## 2020-08-19 RX ADMIN — PROPOFOL 200 MG: 10 INJECTION, EMULSION INTRAVENOUS at 11:29

## 2020-08-19 RX ADMIN — HEPARIN SODIUM 5000 UNITS: 1000 INJECTION, SOLUTION INTRAVENOUS; SUBCUTANEOUS at 12:11

## 2020-08-19 RX ADMIN — FENTANYL CITRATE 50 MCG: 50 INJECTION INTRAMUSCULAR; INTRAVENOUS at 13:27

## 2020-08-19 RX ADMIN — IOHEXOL 89 ML: 300 INJECTION, SOLUTION INTRAVENOUS at 13:11

## 2020-08-19 RX ADMIN — LIDOCAINE HYDROCHLORIDE 4 ML: 40 SOLUTION TOPICAL at 11:30

## 2020-08-19 RX ADMIN — POVIDONE-IODINE 15 ML: 10 SOLUTION TOPICAL at 09:55

## 2020-08-19 RX ADMIN — CEFAZOLIN 2 G: 330 INJECTION, POWDER, FOR SOLUTION INTRAMUSCULAR; INTRAVENOUS at 11:56

## 2020-08-19 RX ADMIN — ROCURONIUM BROMIDE 100 MG: 10 INJECTION, SOLUTION INTRAVENOUS at 11:29

## 2020-08-19 RX ADMIN — SODIUM CHLORIDE, POTASSIUM CHLORIDE, SODIUM LACTATE AND CALCIUM CHLORIDE: 600; 310; 30; 20 INJECTION, SOLUTION INTRAVENOUS at 10:30

## 2020-08-19 ASSESSMENT — LIFESTYLE VARIABLES
TOTAL SCORE: 0
EVER_SMOKED: YES
HAVE YOU EVER FELT YOU SHOULD CUT DOWN ON YOUR DRINKING: NO
HOW MANY TIMES IN THE PAST YEAR HAVE YOU HAD 5 OR MORE DRINKS IN A DAY: 0
AVERAGE NUMBER OF DAYS PER WEEK YOU HAVE A DRINK CONTAINING ALCOHOL: 0
TOTAL SCORE: 0
ON A TYPICAL DAY WHEN YOU DRINK ALCOHOL HOW MANY DRINKS DO YOU HAVE: 0
ALCOHOL_USE: NO
CONSUMPTION TOTAL: NEGATIVE
TOTAL SCORE: 0
EVER HAD A DRINK FIRST THING IN THE MORNING TO STEADY YOUR NERVES TO GET RID OF A HANGOVER: NO
HAVE PEOPLE ANNOYED YOU BY CRITICIZING YOUR DRINKING: NO
EVER FELT BAD OR GUILTY ABOUT YOUR DRINKING: NO

## 2020-08-19 ASSESSMENT — COGNITIVE AND FUNCTIONAL STATUS - GENERAL
SUGGESTED CMS G CODE MODIFIER MOBILITY: CH
MOBILITY SCORE: 24
SUGGESTED CMS G CODE MODIFIER DAILY ACTIVITY: CH
DAILY ACTIVITIY SCORE: 24

## 2020-08-19 ASSESSMENT — PATIENT HEALTH QUESTIONNAIRE - PHQ9
1. LITTLE INTEREST OR PLEASURE IN DOING THINGS: NOT AT ALL
SUM OF ALL RESPONSES TO PHQ9 QUESTIONS 1 AND 2: 0
2. FEELING DOWN, DEPRESSED, IRRITABLE, OR HOPELESS: NOT AT ALL

## 2020-08-19 ASSESSMENT — FIBROSIS 4 INDEX
FIB4 SCORE: 0.77
FIB4 SCORE: 0.77

## 2020-08-19 ASSESSMENT — ENCOUNTER SYMPTOMS
FEVER: 0
DIZZINESS: 0
CHILLS: 0
SHORTNESS OF BREATH: 0
NAUSEA: 0
VOMITING: 0
ABDOMINAL PAIN: 0
DOUBLE VISION: 0
PSYCHIATRIC NEGATIVE: 1
HEADACHES: 0
BLURRED VISION: 0
NECK PAIN: 1

## 2020-08-19 ASSESSMENT — PAIN SCALES - GENERAL: PAIN_LEVEL: 0

## 2020-08-19 NOTE — ANESTHESIA PREPROCEDURE EVALUATION
PICA aneurysm.     Relevant Problems   No relevant active problems     /83   Pulse 66   Temp 36.2 °C (97.2 °F) (Temporal)   Resp 19   Ht 1.829 m (6')   Wt 84.9 kg (187 lb 2.7 oz)   SpO2 97%   BMI 25.38 kg/m²     Physical Exam    Anesthesia Plan    ASA 3 (PICA aneurysm )       Plan - general       Airway plan will be ETT        Induction: intravenous    Postoperative Plan: Postoperative administration of opioids is intended.    Pertinent diagnostic labs and testing reviewed    Informed Consent:    Anesthetic plan and risks discussed with patient.    Use of blood products discussed with: patient whom consented to blood products.

## 2020-08-19 NOTE — OR NURSING
Patient tolerating fluids well; no complaints of nausea  Small amount of bleeding noted upon arrivial to RICU; sandbag replaced  States headache subsided  Transferred to room via [place]  Patient on O2 @ 2 l/m via nasal cannula  O2 tank > 50% full

## 2020-08-19 NOTE — ANESTHESIA PROCEDURE NOTES
Airway    Date/Time: 8/19/2020 11:30 AM  Performed by: Jose De La Garza M.D.  Authorized by: Jose De La Garza M.D.     Location:  OR  Urgency:  Elective  Difficult Airway: No    Indications for Airway Management:  Anesthesia      Spontaneous Ventilation: absent    Sedation Level:  Deep  Preoxygenated: Yes    Patient Position:  Sniffing  Mask Difficulty Assessment:  0 - not attempted  Final Airway Type:  Endotracheal airway  Final Endotracheal Airway:  ETT  Cuffed: Yes    Technique Used for Successful ETT Placement:  Direct laryngoscopy    Insertion Site:  Oral  Blade Type:  Julianna  Laryngoscope Blade/Videolaryngoscope Blade Size:  3  ETT Size (mm):  7.0  Measured from:  Teeth  ETT to Teeth (cm):  24  Placement Verified by: auscultation and capnometry    Cormack-Lehane Classification:  Grade I - full view of glottis  Number of Attempts at Approach:  1

## 2020-08-19 NOTE — OR NURSING
Bleeding noted at right groin; dressing removed, slight ooze noted; manual pressure held for 10 minutes; redressed and sandbag applied.  Dr Cohen notified

## 2020-08-19 NOTE — PROGRESS NOTES
Post surgical note:    S/P endovascular repair of left PICA aneurysm.    Alert and oriented    Clear speech     No deficits.    Plan:    ICU admission for observation

## 2020-08-19 NOTE — OR SURGEON
Immediate Post- Operative Note        PostOp Diagnosis: Left VA/PICA aneurysm      Procedure(s): Endovascular repair    Estimated Blood Loss: Less than 5 ml        Complications: None            8/19/2020     1:04 PM     Yahir Cohen M.D.      
no

## 2020-08-19 NOTE — ANESTHESIA TIME REPORT
Anesthesia Start and Stop Event Times     Date Time Event    8/19/2020 1102 Ready for Procedure     1125 Anesthesia Start     1311 Anesthesia Stop        Responsible Staff  08/19/20    Name Role Begin End    Jose De La Garza M.D. Anesth 1125 1311        Preop Diagnosis (Free Text):  Pre-op Diagnosis             Preop Diagnosis (Codes):    Post op Diagnosis  Brain aneurysm      Premium Reason  Non-Premium    Comments:

## 2020-08-19 NOTE — OR NURSING
Dr Cohen at bedside; spoke with patient.  Stated patient to go to ICU and to admit by Dr ANDRADE Major; order placed  Bleeding right groin; manual pressure held for 8 minutes; no further bleeding noted.

## 2020-08-19 NOTE — ANESTHESIA POSTPROCEDURE EVALUATION
Patient: Wayne Klein    Procedure Summary     Date: 08/19/20 Room / Location: [facility] - INTERVENTIONAL - [facility]    Anesthesia Start: 1125 Anesthesia Stop: 1311    Procedure: IR-EMBOLIZE-NEURO-INTRACRANIAL Diagnosis:       Intracranial aneurysm      Cerebral aneurysm, nonruptured      (VB junction aneurysm)      (unruptured VB junction aneurysm coiling)    Scheduled Providers: Yahir Cohen M.D.; Jose De La Garza M.D. Responsible Provider: Jose De La Garza M.D.    Anesthesia Type: general ASA Status: 3          Final Anesthesia Type: general  Last vitals  BP   Blood Pressure: 126/83    Temp   36.2 °C (97.2 °F)    Pulse   Pulse: 66   Resp   19    SpO2   97 %      Anesthesia Post Evaluation    Patient location during evaluation: PACU  Patient participation: complete - patient participated  Level of consciousness: awake and alert  Pain score: 0    Airway patency: patent  Anesthetic complications: no  Cardiovascular status: hemodynamically stable  Respiratory status: acceptable  Hydration status: euvolemic    PONV: none           Nurse Pain Score: 0 (NPRS)

## 2020-08-20 ENCOUNTER — APPOINTMENT (OUTPATIENT)
Dept: RADIOLOGY | Facility: MEDICAL CENTER | Age: 55
DRG: 272 | End: 2020-08-20
Attending: RADIOLOGY
Payer: MEDICAID

## 2020-08-20 ENCOUNTER — PATIENT OUTREACH (OUTPATIENT)
Dept: HEALTH INFORMATION MANAGEMENT | Facility: OTHER | Age: 55
End: 2020-08-20

## 2020-08-20 DIAGNOSIS — I67.1 CEREBRAL ANEURYSM: ICD-10-CM

## 2020-08-20 PROCEDURE — 700111 HCHG RX REV CODE 636 W/ 250 OVERRIDE (IP)

## 2020-08-20 PROCEDURE — A9270 NON-COVERED ITEM OR SERVICE: HCPCS | Performed by: INTERNAL MEDICINE

## 2020-08-20 PROCEDURE — 700111 HCHG RX REV CODE 636 W/ 250 OVERRIDE (IP): Performed by: INTERNAL MEDICINE

## 2020-08-20 PROCEDURE — 770001 HCHG ROOM/CARE - MED/SURG/GYN PRIV*

## 2020-08-20 PROCEDURE — 700102 HCHG RX REV CODE 250 W/ 637 OVERRIDE(OP): Performed by: INTERNAL MEDICINE

## 2020-08-20 PROCEDURE — 93926 LOWER EXTREMITY STUDY: CPT | Mod: RT

## 2020-08-20 PROCEDURE — 770022 HCHG ROOM/CARE - ICU (200)

## 2020-08-20 PROCEDURE — 99253 IP/OBS CNSLTJ NEW/EST LOW 45: CPT | Performed by: INTERNAL MEDICINE

## 2020-08-20 RX ORDER — ACETAMINOPHEN 325 MG/1
650 TABLET ORAL EVERY 4 HOURS PRN
Status: DISCONTINUED | OUTPATIENT
Start: 2020-08-20 | End: 2020-08-21 | Stop reason: HOSPADM

## 2020-08-20 RX ORDER — LISINOPRIL 20 MG/1
10 TABLET ORAL DAILY
Status: DISCONTINUED | OUTPATIENT
Start: 2020-08-20 | End: 2020-08-21 | Stop reason: HOSPADM

## 2020-08-20 RX ORDER — MORPHINE SULFATE 4 MG/ML
4 INJECTION, SOLUTION INTRAMUSCULAR; INTRAVENOUS
Status: DISCONTINUED | OUTPATIENT
Start: 2020-08-20 | End: 2020-08-21 | Stop reason: HOSPADM

## 2020-08-20 RX ORDER — MORPHINE SULFATE 10 MG/ML
INJECTION, SOLUTION INTRAMUSCULAR; INTRAVENOUS
Status: COMPLETED
Start: 2020-08-20 | End: 2020-08-20

## 2020-08-20 RX ADMIN — ASPIRIN 81 MG: 81 TABLET, CHEWABLE ORAL at 18:18

## 2020-08-20 RX ADMIN — MORPHINE SULFATE 4 MG: 10 INJECTION INTRAVENOUS at 09:03

## 2020-08-20 RX ADMIN — LISINOPRIL 10 MG: 20 TABLET ORAL at 16:27

## 2020-08-20 RX ADMIN — ACETAMINOPHEN 650 MG: 325 TABLET, FILM COATED ORAL at 16:27

## 2020-08-20 RX ADMIN — NICOTINE TRANSDERMAL SYSTEM 21 MG: 21 PATCH, EXTENDED RELEASE TRANSDERMAL at 05:35

## 2020-08-20 RX ADMIN — MORPHINE SULFATE 4 MG: 4 INJECTION INTRAVENOUS at 14:26

## 2020-08-20 ASSESSMENT — ENCOUNTER SYMPTOMS
WEIGHT LOSS: 0
VOMITING: 0
FEVER: 0
DOUBLE VISION: 0
TINGLING: 0
FOCAL WEAKNESS: 0
SENSORY CHANGE: 0
COUGH: 0
ORTHOPNEA: 0
PHOTOPHOBIA: 0
MYALGIAS: 0
NAUSEA: 0
BLURRED VISION: 0
SPUTUM PRODUCTION: 0
DIARRHEA: 0
PALPITATIONS: 0
CHILLS: 0
EYE PAIN: 0
BACK PAIN: 0
HALLUCINATIONS: 0
TREMORS: 0
CONSTIPATION: 0
DIZZINESS: 0
ABDOMINAL PAIN: 0
SHORTNESS OF BREATH: 0
HEADACHES: 0
SPEECH CHANGE: 0
NECK PAIN: 0

## 2020-08-20 ASSESSMENT — LIFESTYLE VARIABLES: SUBSTANCE_ABUSE: 0

## 2020-08-20 NOTE — THERAPY
Missed Therapy     Patient Name: Wayne Klein  Age:  55 y.o., Sex:  male  Medical Record #: [id number]  Today's Date: 8/20/2020    Discussed missed therapy with RN:       08/20/20 1031   Initial Contact Note    Initial Contact Note Order Received and Verified, Physical Therapy Evaluation in Progress with Full Report to Follow.   Interdisciplinary Plan of Care Collaboration   IDT Collaboration with  Nursing   Collaboration Comments hold today, pt with R groin hematoma, now with pressure applied and not appropriate for OOB. Check back 8/21.

## 2020-08-20 NOTE — CONSULTS
Critical Care Consultation  (H&P)    Date of consult: 8/19/2020    Referring Physician  Dr. Ellington    Reason for Consultation  I was asked to provide critical care admission status post left PICA aneurysm embolization.    History of Presenting Illness  This is a 55-year-old man with a history of intermittent episodes of lightheadedness and hypertension.  He has not ever had syncope.  These episodes are not associated with chest pain or shortness of breath.  He presented to the emergency department on 8/4/2020 with these complaints.  A CT angiogram was performed and revealed a 4.5 mm aneurysm of the vertebrobasilar junction.  He was referred to neuroradiology.  He was electively admitted to the interventional radiology suite where Dr. Ellington performed a left PICA aneurysm embolization.  The procedure was uncomplicated.  Critical care consultation for postoperative management was requested.    Code Status  Full Code    Review of Systems  Review of Systems   Constitutional: Negative for chills and fever.   HENT: Negative.    Eyes: Negative for blurred vision and double vision.   Respiratory: Negative for shortness of breath.    Cardiovascular: Negative for chest pain.   Gastrointestinal: Negative for abdominal pain, nausea and vomiting.   Musculoskeletal: Positive for neck pain.        Chronic neck pain no worse today.   Neurological: Negative for dizziness and headaches.   Psychiatric/Behavioral: Negative.    All other systems reviewed and are negative.      Past Medical History   has a past medical history of Arthritis, Dental disorder, Heart burn, Hypertension, and Pain (08/2020).    Surgical History   has a past surgical history that includes bowel resection; bowel resection (2018); and ankle orif (Left).    Family History  family history is not on file.    Social History   reports that he has been smoking cigarettes. He has a 10.00 pack-year smoking history. He has quit using smokeless tobacco.  His smokeless tobacco  use included chew. He reports that he does not drink alcohol or use drugs.    Medications  Home Medications     Reviewed by Triica Thacker R.N. (Registered Nurse) on 08/19/20 at 1752  Med List Status: Complete   Medication Last Dose Status   acetaminophen (TYLENOL) 325 MG Tab  Active   Ascorbic Acid (VITAMIN C PO) 8/5/2020 Active   aspirin (ASA) 81 MG Chew Tab chewable tablet 8/19/2020 Active   cetirizine (ZYRTEC) 10 MG Tab  Active   clopidogrel (PLAVIX) 75 MG Tab 8/19/2020 Active   ibuprofen (MOTRIN) 200 MG Tab  Active   lisinopril (PRINIVIL) 10 MG Tab 8/18/2020 Active              Current Facility-Administered Medications   Medication Dose Route Frequency Provider Last Rate Last Dose   • lactated ringers infusion   Intravenous Continuous Yahir Cohen M.D.   Stopped at 08/19/20 1803   • SUGAMMADEX SODIUM 200 MG/2ML IV SOLN            • labetalol (NORMODYNE/TRANDATE) injection 10 mg  10 mg Intravenous Q4HRS PRN Bacilio Major M.D.        Or   • hydrALAZINE (APRESOLINE) injection 10 mg  10 mg Intravenous Q2HRS PRN Bacilio Major M.D.       • [START ON 8/20/2020] aspirin (ASA) chewable tab 81 mg  81 mg Oral DAILY Bacilio Major M.D.       • [START ON 8/20/2020] nicotine (NICODERM) 21 MG/24HR 21 mg  21 mg Transdermal Daily-0600 Bacilio Major M.D.           Allergies  No Known Allergies    Vital Signs last 24 hours  Temp:  [36.2 °C (97.2 °F)-37.1 °C (98.7 °F)] 36.7 °C (98.1 °F)  Pulse:  [50-66] 65  Resp:  [8-19] 15  BP: (116-139)/(59-85) 133/76  SpO2:  [97 %-100 %] 99 %    Physical Exam  Physical Exam  Constitutional:       General: He is not in acute distress.  HENT:      Mouth/Throat:      Mouth: Mucous membranes are moist.   Eyes:      Extraocular Movements: Extraocular movements intact.      Pupils: Pupils are equal, round, and reactive to light.   Neck:      Musculoskeletal: Neck supple.   Cardiovascular:      Rate and Rhythm: Regular rhythm.      Heart sounds: No murmur. No friction rub. No gallop.     Pulmonary:      Effort: Pulmonary effort is normal. No respiratory distress.      Breath sounds: Normal breath sounds.   Abdominal:      General: Abdomen is flat.      Palpations: Abdomen is soft.      Comments: Right arteriotomy site is flat, no hematoma.  There was some oozing postoperatively.  A FemoStop was placed for 2 hours hemostasis was achieved.  Right DP pulse 1+ left DP pulse 2+.   Skin:     General: Skin is warm and dry.   Neurological:      General: No focal deficit present.      Mental Status: He is alert and oriented to person, place, and time.      Cranial Nerves: No cranial nerve deficit.      Motor: No weakness.   Psychiatric:         Mood and Affect: Mood normal.         Behavior: Behavior normal.         Fluids    Intake/Output Summary (Last 24 hours) at 8/19/2020 2110  Last data filed at 8/19/2020 1851  Gross per 24 hour   Intake 815 ml   Output 430 ml   Net 385 ml       Laboratory  Recent Results (from the past 48 hour(s))   POC ACT (resulted by nursing)    Collection Time: 08/19/20 12:32 PM   Result Value Ref Range    Istat Activated Clotting Time 202 (H) 74 - 137 sec       Imaging  IR-EMBOLIZE-NEURO-INTRACRANIAL    (Results Pending)       Assessment/Plan  * Aneurysm (HCC)  Assessment & Plan  Vertebral basilar artery aneurysm status post embolization  Systolic blood pressure goal less than 130 mmHg  Serial neurologic exams  Serial neurovascular exams of the lower extremities    Hypertension  Assessment & Plan  Continue outpatient lisinopril regimen  Labetalol/hydralazine PRN for breakthrough hypertension    Tobacco abuse  Assessment & Plan  Nicotine patch 21 mg  Cessation counseling      Discussed patient condition and risk of morbidity and/or mortality with RN, Patient and Dr. Ellington.        This patient remains at high risk for worsening central nervous system dysfunction, requiring frequent neurological assessment and strict blood pressure control.  I have assessed and reassessed the  neurologic exam, blood pressure, and hemodynamics     Critical care time 60 minutes in directly providing and coordinating critical care and extensive data review.  No time overlap and excludes procedures.

## 2020-08-20 NOTE — PROGRESS NOTES
Patient arrived to unit and full bed bath given, neuro check completed. Sheath site saturated with sanguinous drainage, pressure dressing placed and 10 lb sand bag placed.

## 2020-08-20 NOTE — ASSESSMENT & PLAN NOTE
He underwent endovascular repair of left PICA aneurysm on August 19, 2020.  Postprocedure he admitted to the ICU for close monitoring.  I discussed plan of care with intensivist as well as with interventional neurology.  Continue to monitor closely.  Intervention radiologist order ultrasound extremity arterial study.  I discussed plan of care with him and bedside RN.

## 2020-08-20 NOTE — PROGRESS NOTES
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 Admitted to ICU for post procedure neurologic monitoring.     Today, the patient complains of chronic neck pain, unchanged from baseline, and fatigue from being awake much of the night. Denies headache, visual changes, or focal weakness. Has not eaten or gotten OOB. Denies urinary retention.   Awake, oriented x 4. Face symmetric, speech fluent. No drift. Strength 5/5 x 4. Rt femoral angio site w/o ecchymosis, swelling, ooze, or discharge. DP/ PT pulses 3+.     From a NeuroInterventional Service standpoint, OK to discharge to home when medically cleared by Hospitalist Service. Patient to follow up in our clinic in 2 - 4 weeks, our office to arrange appointment. May DC Plavix but does need to continue aspirin for 1 month our standpoint.     Post-angioseal instructions: no lifting greater than 5 lbs and no baths/ swimming/ soaking in tub for 5 days. Shower OK. OK to change dressings to band aid as needed.

## 2020-08-20 NOTE — THERAPY
Missed Therapy     Patient Name: Wayne Klein  Age:  55 y.o., Sex:  male  Medical Record #: [id number]  Today's Date: 8/20/2020    Discussed missed therapy with RN       08/20/20 1159   Treatment Variance   Reason For Missed Therapy Non-Medical - Other (Please Comment)  (Not indicated/warranted per RN)   Total Time Spent   Total Time Spent (Mins) 5   Initial Contact Note    Initial Contact Note  Order Received and Verified. Speech Therapy Evaluation NOT Completed Because Patient Does Not Require Acute Speech Therapy at this Time.   Interdisciplinary Plan of Care Collaboration   IDT Collaboration with  Nursing   Collaboration Comments Attempted to see Pt for clinical swallow evaluation. However, per RN, Pt is on Regular diet and has demonstrated no swallowing difficulties. RN reported CSE is not indicated/warranted. Additionally, Pt with cognitive-linguistic evaluation order. Will confer with MD to determine if it is appropriate to cancel both orders. Thank you.

## 2020-08-20 NOTE — PROGRESS NOTES
Dr. Major at bedside, groin site assessed. Patient updated on plan of care. Received orders for nicotine patches.

## 2020-08-20 NOTE — PROGRESS NOTES
Right groin site assessed,  Sand bag removed and site condition improved.  [place] sized hematoma now noted.  Site still quite tender to touch.  Continue to monitor especially when pt gets up to ambulate again.

## 2020-08-20 NOTE — CARE PLAN
Problem: Knowledge Deficit  Goal: Knowledge of disease process/condition, treatment plan, diagnostic tests, and medications will improve  Outcome: PROGRESSING AS EXPECTED  Note: Patient and wife are both knowledgeable regarding patient's IR procedure today, plan of care for tonight, and the need to hold anticoagulant medications due to oozing from groin site.     Problem: Pain Management  Goal: Pain level will decrease to patient's comfort goal  Outcome: PROGRESSING AS EXPECTED  Note: Patient's pain under control, 2/10 tenderness at groin site post IR. Patient states he is comfortable at this time.

## 2020-08-20 NOTE — PROGRESS NOTES
Patient assisted up out of bed and ambulated to bathroom.  Upon return to bed right groin puncture site area swelling increased to baseball size and very painful.  No new blood noted to exterior of dressing.  MD notified and pt positioned flat in bed and sandbag applied.  Page Placed to IR and Dr. Cohen notified.

## 2020-08-20 NOTE — ASSESSMENT & PLAN NOTE
Continue outpatient lisinopril.  PRN hydralazine and labetalol with parameters  Continue monitor closely.

## 2020-08-20 NOTE — PROGRESS NOTES
IR RN at bedside, groin site assessed. No further concern at this time. Will continue to monitor for oozing and palpable pedal pulses.

## 2020-08-20 NOTE — ASSESSMENT & PLAN NOTE
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

## 2020-08-20 NOTE — PROGRESS NOTES
Dressing noted to have drainage on new dressing, site still soft and intact. IR called, transferred to IR RN. Discussed with RN, IR RN discussed findings with IR MD.     1622- call back received from Delmy, per IR MD place fem-stop and follow guidelines.

## 2020-08-20 NOTE — CONSULTS
Hospital Medicine Consultation    Date of Service  8/20/2020    Referring Physician  Bacilio Major M.D.    Consulting Physician  Aide Stout M.D.    Reason for Consultation  Management post left PICA aneurysm embolization    History of Presenting Illness  55 y.o. male with past medical history of hypertension who presented to the hospital on 8/19/2020 for elective left PICA aneurysm embolization by interventional radiology Dr. Ellington.  I evaluated and examined him at the bedside.  He reported that he is feeling better and he had his breakfast.  He underwent endovascular repair of left aneurysm on August 19, 2020.  I discussed plan of care with interventional radiology as well as with intensivist Dr. Major.  Initial plan was to discharge him from the hospital and follow-up with primary care as well as with interventional radiology but he found to have possible hematoma on right groin and intervention radiology ordered ultrasound extremity arterial study.    Review of Systems  Review of Systems   Constitutional: Negative for chills, fever and weight loss.   HENT: Negative for hearing loss and tinnitus.    Eyes: Negative for blurred vision, double vision, photophobia and pain.   Respiratory: Negative for cough, sputum production and shortness of breath.    Cardiovascular: Negative for chest pain, palpitations, orthopnea and leg swelling.   Gastrointestinal: Negative for abdominal pain, constipation, diarrhea, nausea and vomiting.   Genitourinary: Negative for dysuria, frequency and urgency.   Musculoskeletal: Negative for back pain, joint pain, myalgias and neck pain.   Skin: Negative for rash.   Neurological: Negative for dizziness, tingling, tremors, sensory change, speech change, focal weakness and headaches.   Psychiatric/Behavioral: Negative for hallucinations and substance abuse.   All other systems reviewed and are negative.      Past Medical History   has a past medical history of Arthritis, Dental  disorder, Heart burn, Hypertension, and Pain (08/2020).    Surgical History   has a past surgical history that includes bowel resection; bowel resection (2018); and ankle orif (Left).    Family History  family history is not on file.    Social History   reports that he has been smoking cigarettes. He has a 10.00 pack-year smoking history. He has quit using smokeless tobacco.  His smokeless tobacco use included chew. He reports that he does not drink alcohol or use drugs.    Medications  Prior to Admission Medications   Prescriptions Last Dose Informant Patient Reported? Taking?   Ascorbic Acid (VITAMIN C PO) 8/5/2020  Yes No   Sig: Take  by mouth every day.   acetaminophen (TYLENOL) 325 MG Tab   Yes No   Sig: Take 650 mg by mouth every four hours as needed.   aspirin (ASA) 81 MG Chew Tab chewable tablet 8/19/2020 at 0630  Yes No   Sig: Take 81 mg by mouth every day.   cetirizine (ZYRTEC) 10 MG Tab   Yes No   Sig: as needed.   clopidogrel (PLAVIX) 75 MG Tab 8/19/2020 at 0630  No No   Sig: Take 1 Tab by mouth every day.   ibuprofen (MOTRIN) 200 MG Tab   Yes No   Sig: Take 200 mg by mouth every 6 hours as needed.   lisinopril (PRINIVIL) 10 MG Tab 8/18/2020  No No   Sig: Take 1 Tab by mouth every day.      Facility-Administered Medications: None       Allergies  No Known Allergies    Physical Exam  Temp:  [36.6 °C (97.8 °F)-37.1 °C (98.7 °F)] 36.7 °C (98 °F)  Pulse:  [50-70] 70  Resp:  [8-26] 13  BP: ()/(59-85) 123/80  SpO2:  [93 %-100 %] 93 %    Physical Exam  Vitals signs reviewed.   Constitutional:       General: He is not in acute distress.  HENT:      Head: Normocephalic and atraumatic. No contusion.      Right Ear: External ear normal.      Left Ear: External ear normal.      Nose: Nose normal.      Mouth/Throat:      Mouth: Mucous membranes are dry.      Pharynx: No oropharyngeal exudate.   Eyes:      General:         Right eye: No discharge.         Left eye: No discharge.      Pupils: Pupils are equal,  round, and reactive to light.   Neck:      Musculoskeletal: No neck rigidity or muscular tenderness.   Cardiovascular:      Rate and Rhythm: Normal rate and regular rhythm.      Heart sounds: No murmur. No friction rub. No gallop.    Pulmonary:      Effort: Pulmonary effort is normal.      Breath sounds: No wheezing or rhonchi.   Abdominal:      General: Bowel sounds are normal. There is no distension.      Palpations: Abdomen is soft.      Tenderness: There is no abdominal tenderness. There is no rebound.   Musculoskeletal: Normal range of motion.         General: No swelling or tenderness.   Skin:     General: Skin is warm and dry.      Coloration: Skin is not jaundiced.   Neurological:      General: No focal deficit present.      Mental Status: He is alert and oriented to person, place, and time.      Cranial Nerves: No cranial nerve deficit.      Sensory: No sensory deficit.      Comments: No focal neurological deficit.  Moving all extremities.   Psychiatric:         Mood and Affect: Mood normal.         Fluids      Laboratory                          Imaging  US-EXTREMITY ARTERY LOWER UNILAT RIGHT         IR-EMBOLIZE-NEURO-INTRACRANIAL    (Results Pending)       Assessment/Plan  * Aneurysm (HCC)  Assessment & Plan  He underwent endovascular repair of left PICA aneurysm on August 19, 2020.  Postprocedure he admitted to the ICU for close monitoring.  I discussed plan of care with intensivist as well as with interventional neurology.  Continue to monitor closely.  Intervention radiologist order ultrasound extremity arterial study.  I discussed plan of care with him and bedside RN.     Hypertension  Assessment & Plan  Continue outpatient lisinopril.  PRN hydralazine and labetalol with parameters  Continue monitor closely.    Tobacco abuse  Assessment & Plan  Nicotine replacement therapy.  Counseling for smoking cessation.    Discussed plan of care with intensivist Dr. Major as well as with interventional  radiology.  I discussed plan of care with bedside RN.

## 2020-08-20 NOTE — PROGRESS NOTES
Patient not to be D/C'd home today due to issues with right groin puncture site but OK to transfer to floor and most likely home tomorrow per Dr. Stout.

## 2020-08-21 VITALS
TEMPERATURE: 98.6 F | HEIGHT: 71 IN | HEART RATE: 56 BPM | WEIGHT: 197.31 LBS | RESPIRATION RATE: 15 BRPM | DIASTOLIC BLOOD PRESSURE: 67 MMHG | BODY MASS INDEX: 27.62 KG/M2 | OXYGEN SATURATION: 97 % | SYSTOLIC BLOOD PRESSURE: 108 MMHG

## 2020-08-21 LAB
ALBUMIN SERPL BCP-MCNC: 3.6 G/DL (ref 3.2–4.9)
ALBUMIN/GLOB SERPL: 1.6 G/DL
ALP SERPL-CCNC: 69 U/L (ref 30–99)
ALT SERPL-CCNC: 9 U/L (ref 2–50)
ANION GAP SERPL CALC-SCNC: 13 MMOL/L (ref 7–16)
AST SERPL-CCNC: 9 U/L (ref 12–45)
BILIRUB SERPL-MCNC: 0.6 MG/DL (ref 0.1–1.5)
BUN SERPL-MCNC: 16 MG/DL (ref 8–22)
CALCIUM SERPL-MCNC: 8.7 MG/DL (ref 8.5–10.5)
CHLORIDE SERPL-SCNC: 103 MMOL/L (ref 96–112)
CO2 SERPL-SCNC: 22 MMOL/L (ref 20–33)
CREAT SERPL-MCNC: 0.82 MG/DL (ref 0.5–1.4)
ERYTHROCYTE [DISTWIDTH] IN BLOOD BY AUTOMATED COUNT: 48 FL (ref 35.9–50)
GLOBULIN SER CALC-MCNC: 2.2 G/DL (ref 1.9–3.5)
GLUCOSE SERPL-MCNC: 98 MG/DL (ref 65–99)
HCT VFR BLD AUTO: 43.1 % (ref 42–52)
HGB BLD-MCNC: 14.5 G/DL (ref 14–18)
MAGNESIUM SERPL-MCNC: 2.1 MG/DL (ref 1.5–2.5)
MCH RBC QN AUTO: 32.3 PG (ref 27–33)
MCHC RBC AUTO-ENTMCNC: 33.6 G/DL (ref 33.7–35.3)
MCV RBC AUTO: 96 FL (ref 81.4–97.8)
PLATELET # BLD AUTO: 176 K/UL (ref 164–446)
PMV BLD AUTO: 9.8 FL (ref 9–12.9)
POTASSIUM SERPL-SCNC: 4.1 MMOL/L (ref 3.6–5.5)
PROT SERPL-MCNC: 5.8 G/DL (ref 6–8.2)
RBC # BLD AUTO: 4.49 M/UL (ref 4.7–6.1)
SODIUM SERPL-SCNC: 138 MMOL/L (ref 135–145)
WBC # BLD AUTO: 5.9 K/UL (ref 4.8–10.8)

## 2020-08-21 PROCEDURE — 97165 OT EVAL LOW COMPLEX 30 MIN: CPT

## 2020-08-21 PROCEDURE — 83735 ASSAY OF MAGNESIUM: CPT

## 2020-08-21 PROCEDURE — 99239 HOSP IP/OBS DSCHRG MGMT >30: CPT | Performed by: INTERNAL MEDICINE

## 2020-08-21 PROCEDURE — 700102 HCHG RX REV CODE 250 W/ 637 OVERRIDE(OP): Performed by: INTERNAL MEDICINE

## 2020-08-21 PROCEDURE — A9270 NON-COVERED ITEM OR SERVICE: HCPCS | Performed by: INTERNAL MEDICINE

## 2020-08-21 PROCEDURE — 80053 COMPREHEN METABOLIC PANEL: CPT

## 2020-08-21 PROCEDURE — 85027 COMPLETE CBC AUTOMATED: CPT

## 2020-08-21 RX ADMIN — ASPIRIN 81 MG: 81 TABLET, CHEWABLE ORAL at 05:56

## 2020-08-21 RX ADMIN — LISINOPRIL 10 MG: 20 TABLET ORAL at 05:56

## 2020-08-21 RX ADMIN — NICOTINE TRANSDERMAL SYSTEM 21 MG: 21 PATCH, EXTENDED RELEASE TRANSDERMAL at 06:00

## 2020-08-21 NOTE — THERAPY
Missed Therapy     Patient Name: Wayne Klein  Age:  55 y.o., Sex:  male  Medical Record #: [id number]  Today's Date: 8/21/2020    Discussed missed therapy with RN. Cognitive evaluation not indicated and OK to cancel at this time. Please re-consult with a change in status or with any ongoing concerns for cognitive impairment. Thank you.        08/21/20 0944   Treatment Variance   Reason For Missed Therapy Medical - Other (Please Comment)

## 2020-08-21 NOTE — CARE PLAN

## 2020-08-21 NOTE — DISCHARGE SUMMARY
Discharge Summary    CHIEF COMPLAINT ON ADMISSION  No chief complaint on file.      Reason for Admission  Cerebral aneurysm, nonruptured     Admission Date  8/19/2020    CODE STATUS  Full Code    HPI & HOSPITAL COURSE    55 y.o. male with past medical history of hypertension who presented to the hospital on 8/19/2020 for elective left PICA aneurysm embolization by interventional radiology Dr. Ellington. He underwent endovascular repair of left aneurysm on August 19, 2020.  Next day after his procedure he developed hematoma in inguinal area and ultrasound arterial was ordered and it did not show any acute abnormalities.  He remain in the hospital for close observation.  Today I evaluated and examined him at the bedside. I examined the right inguinal area and it did not show any active bleeding.  I discussed plan of care with him and I recommended him to follow-up with primary care provider as well as with interventional radiology.   I highly recommended him to check his blood pressure twice a day and make a blood pressure log and bring it to primary care provider appointment. Interventional radiology recommended to stop taking plavix and take aspirin for next 30 days. I discussed IR recommendations that not to swim, take bath or lift heavy object more than 5 lbs and he expressed understanding. Today on the day of discharge he denies any acute complaints and he feels ready to be discharged. I discussed about this discharge with interventional radiology as well as with Dr. Major.       Therefore, he is discharged in fair and stable condition to home with close outpatient follow-up.    The patient met 2-midnight criteria for an inpatient stay at the time of discharge.    Discharge Date  08/21/20      FOLLOW UP ITEMS POST DISCHARGE  Primary care provider  Interventional radiology    DISCHARGE DIAGNOSES  Principal Problem:    Aneurysm (HCC) POA: Unknown  Active Problems:    Hypertension POA: Unknown    Tobacco abuse POA:  Unknown  Resolved Problems:    * No resolved hospital problems. *      FOLLOW UP    Corby Villavicencio D.O.  [address]  [facility] [address]  [facility] [id number] [phone]    Go on 8/27/2020  Please arrive at 11:00 am for your hospital follow up with Alexis Swartz. Thank you       MEDICATIONS ON DISCHARGE     Medication List      CONTINUE taking these medications      Instructions   acetaminophen 325 MG Tabs  Commonly known as: TYLENOL   Take 650 mg by mouth every four hours as needed.  Dose: 650 mg     aspirin 81 MG Chew chewable tablet  Commonly known as: ASA   Take 81 mg by mouth every day.  Dose: 81 mg     cetirizine 10 MG Tabs  Commonly known as: ZYRTEC   as needed.     lisinopril 10 MG Tabs  Commonly known as: PRINIVIL   Take 1 Tab by mouth every day.  Dose: 10 mg     VITAMIN C PO   Take  by mouth every day.        STOP taking these medications    clopidogrel 75 MG Tabs  Commonly known as: PLAVIX     ibuprofen 200 MG Tabs  Commonly known as: MOTRIN            Allergies  No Known Allergies    DIET  Orders Placed This Encounter   Procedures   • Diet Order Regular     Standing Status:   Standing     Number of Occurrences:   1     Order Specific Question:   Diet:     Answer:   Regular [1]       ACTIVITY  As tolerated.  Weight bearing as tolerated    CONSULTATIONS  Critical care  Interventional radiology    PROCEDURES  Endovascular repair of Left VA/PICA aneurysm on Aug 19,2020    LABORATORY  Lab Results   Component Value Date    SODIUM 138 08/21/2020    POTASSIUM 4.1 08/21/2020    CHLORIDE 103 08/21/2020    CO2 22 08/21/2020    GLUCOSE 98 08/21/2020    BUN 16 08/21/2020    CREATININE 0.82 08/21/2020        Lab Results   Component Value Date    WBC 5.9 08/21/2020    HEMOGLOBIN 14.5 08/21/2020    HEMATOCRIT 43.1 08/21/2020    PLATELETCT 176 08/21/2020      US-EXTREMITY ARTERY LOWER UNILAT RIGHT   Final Result      IR-EMBOLIZE-NEURO-INTRACRANIAL    (Results Pending)         Total time of the discharge process exceeds 32  minutes.

## 2020-08-21 NOTE — PROGRESS NOTES
Patient received AVS and was educated by this RN.  Wife called and is on her way.  Patient refused escort so he will be ambulating to exit by self.

## 2020-08-21 NOTE — THERAPY
Physical Therapy Contact Note    Pt consult received and acknowledged. Discussed with RN regarding pt's mobility. Pt is ambulating around unit without difficulty. Discussed with pt and he endorses that he feels as if he is at baseline with his functional mobility. No PT eval required; anticipate DC home with no further PT needs.     8/21 - Lazarus Dickson Delta, SPT       08/21/20 0825   Prior Living Situation   Prior Services None   Housing / Facility 1 Story House   Steps Into Home 1   Steps In Home 0   Lives with - Patient's Self Care Capacity Spouse   Comments able to get assistance from spouse if necessary   Prior Level of Functional Mobility   Bed Mobility Independent   Transfer Status Independent   Ambulation Independent   Distance Ambulation (Feet)   (community distances)   Assistive Devices Used None   Stairs Independent   Comments pt endorses he was independent with functional mobility prior to admission   Session Information   Date / Session Number  8/21 - DC needs   Priority 0

## 2020-08-21 NOTE — DISCHARGE INSTRUCTIONS
DISCHARGE INSTRUCTIONS PER Aide Stout M.D.    Diagnosis: PICA aneurysm    Post-angioseal instructions: no lifting greater than 5 lbs and no baths/ swimming/ soaking in tub for 5 days. Shower OK. OK to change dressings to band aid as needed.    Please take medication as prescribed.  Please take aspirin and stop taking Plavix.  Please follow-up with primary care provider as well as with interventional radiology.    Please check your blood pressure twice a day and make a blood pressure log and bring it to your primary care provider appointment.    For any medical emergency please go to the nearest emergency room or call 911.    Discharge Instructions    Discharged to home by car with relative. Discharged via self, hospital escort: Refused.  Special equipment needed: Not Applicable    Be sure to schedule a follow-up appointment with your primary care doctor or any specialists as instructed.     Discharge Plan:   Smoking Cessation Offered: Patient Refused    I understand that a diet low in cholesterol, fat, and sodium is recommended for good health. Unless I have been given specific instructions below for another diet, I accept this instruction as my diet prescription.   Other diet: N/A        · Is patient discharged on Warfarin / Coumadin?   No     Depression / Suicide Risk    As you are discharged from this [facility] facility, it is important to learn how to keep safe from harming yourself.    Recognize the warning signs:  · Abrupt changes in personality, positive or negative- including increase in energy   · Giving away possessions  · Change in eating patterns- significant weight changes-  positive or negative  · Change in sleeping patterns- unable to sleep or sleeping all the time   · Unwillingness or inability to communicate  · Depression  · Unusual sadness, discouragement and loneliness  · Talk of wanting to die  · Neglect of personal appearance   · Rebelliousness- reckless behavior  · Withdrawal  from people/activities they love  · Confusion- inability to concentrate     If you or a loved one observes any of these behaviors or has concerns about self-harm, here's what you can do:  · Talk about it- your feelings and reasons for harming yourself  · Remove any means that you might use to hurt yourself (examples: pills, rope, extension cords, firearm)  · Get professional help from the community (Mental Health, Substance Abuse, psychological counseling)  · Do not be alone:Call your Safe Contact- someone whom you trust who will be there for you.  · Call your local CRISIS HOTLINE 233-7686 or 945-378-2446  · Call your local Children's Mobile Crisis Response Team [place] (772) 415-2194 or www.ipvive  · Call the toll free National Suicide Prevention Hotlines   · National Suicide Prevention Lifeline 092-775-VEQS (0808)  · National Hope Line Network 800-SUICIDE (128-2620)

## 2020-08-24 ENCOUNTER — APPOINTMENT (OUTPATIENT)
Dept: RADIOLOGY | Facility: MEDICAL CENTER | Age: 55
End: 2020-08-24
Attending: EMERGENCY MEDICINE
Payer: MEDICAID

## 2020-08-24 ENCOUNTER — APPOINTMENT (OUTPATIENT)
Dept: RADIOLOGY | Facility: MEDICAL CENTER | Age: 55
End: 2020-08-24
Attending: STUDENT IN AN ORGANIZED HEALTH CARE EDUCATION/TRAINING PROGRAM
Payer: MEDICAID

## 2020-08-24 ENCOUNTER — HOSPITAL ENCOUNTER (OUTPATIENT)
Dept: RADIOLOGY | Facility: MEDICAL CENTER | Age: 55
End: 2020-08-24
Payer: MEDICAID

## 2020-08-24 ENCOUNTER — HOSPITAL ENCOUNTER (OUTPATIENT)
Facility: MEDICAL CENTER | Age: 55
End: 2020-08-25
Attending: EMERGENCY MEDICINE | Admitting: INTERNAL MEDICINE
Payer: MEDICAID

## 2020-08-24 DIAGNOSIS — R42 DIZZINESS: ICD-10-CM

## 2020-08-24 LAB
APPEARANCE UR: CLEAR
BILIRUB UR QL STRIP.AUTO: NEGATIVE
COLOR UR: YELLOW
GLUCOSE UR STRIP.AUTO-MCNC: NEGATIVE MG/DL
KETONES UR STRIP.AUTO-MCNC: NEGATIVE MG/DL
LEUKOCYTE ESTERASE UR QL STRIP.AUTO: NEGATIVE
MICRO URNS: NORMAL
NITRITE UR QL STRIP.AUTO: NEGATIVE
PH UR STRIP.AUTO: 5 [PH] (ref 5–8)
PROT UR QL STRIP: NEGATIVE MG/DL
RBC UR QL AUTO: NEGATIVE
SP GR UR STRIP.AUTO: 1.02
UROBILINOGEN UR STRIP.AUTO-MCNC: 0.2 MG/DL

## 2020-08-24 PROCEDURE — 70553 MRI BRAIN STEM W/O & W/DYE: CPT

## 2020-08-24 PROCEDURE — 99220 PR INITIAL OBSERVATION CARE,LEVL III: CPT | Mod: GC | Performed by: INTERNAL MEDICINE

## 2020-08-24 PROCEDURE — 700117 HCHG RX CONTRAST REV CODE 255: Performed by: STUDENT IN AN ORGANIZED HEALTH CARE EDUCATION/TRAINING PROGRAM

## 2020-08-24 PROCEDURE — G0378 HOSPITAL OBSERVATION PER HR: HCPCS

## 2020-08-24 PROCEDURE — 99285 EMERGENCY DEPT VISIT HI MDM: CPT

## 2020-08-24 PROCEDURE — C9803 HOPD COVID-19 SPEC COLLECT: HCPCS | Performed by: INTERNAL MEDICINE

## 2020-08-24 PROCEDURE — 70498 CT ANGIOGRAPHY NECK: CPT

## 2020-08-24 PROCEDURE — 70496 CT ANGIOGRAPHY HEAD: CPT

## 2020-08-24 PROCEDURE — 81003 URINALYSIS AUTO W/O SCOPE: CPT

## 2020-08-24 PROCEDURE — 700117 HCHG RX CONTRAST REV CODE 255: Performed by: EMERGENCY MEDICINE

## 2020-08-24 PROCEDURE — A9576 INJ PROHANCE MULTIPACK: HCPCS | Performed by: STUDENT IN AN ORGANIZED HEALTH CARE EDUCATION/TRAINING PROGRAM

## 2020-08-24 RX ORDER — POLYETHYLENE GLYCOL 3350 17 G/17G
1 POWDER, FOR SOLUTION ORAL
Status: DISCONTINUED | OUTPATIENT
Start: 2020-08-24 | End: 2020-08-25 | Stop reason: HOSPADM

## 2020-08-24 RX ORDER — BISACODYL 10 MG
10 SUPPOSITORY, RECTAL RECTAL
Status: DISCONTINUED | OUTPATIENT
Start: 2020-08-24 | End: 2020-08-25 | Stop reason: HOSPADM

## 2020-08-24 RX ORDER — AMOXICILLIN 250 MG
2 CAPSULE ORAL 2 TIMES DAILY
Status: DISCONTINUED | OUTPATIENT
Start: 2020-08-24 | End: 2020-08-25 | Stop reason: HOSPADM

## 2020-08-24 RX ORDER — CLOPIDOGREL BISULFATE 75 MG/1
75 TABLET ORAL DAILY
Status: ON HOLD | COMMUNITY
End: 2020-08-25

## 2020-08-24 RX ADMIN — GADOTERIDOL 17 ML: 279.3 INJECTION, SOLUTION INTRAVENOUS at 17:39

## 2020-08-24 RX ADMIN — IOHEXOL 80 ML: 350 INJECTION, SOLUTION INTRAVENOUS at 11:55

## 2020-08-24 ASSESSMENT — ENCOUNTER SYMPTOMS
PHOTOPHOBIA: 0
ABDOMINAL PAIN: 0
PALPITATIONS: 0
WHEEZING: 0
FEVER: 0
HEMOPTYSIS: 0
TINGLING: 0
NAUSEA: 1
BACK PAIN: 0
SORE THROAT: 0
NECK PAIN: 1
LOSS OF CONSCIOUSNESS: 0
VOMITING: 0
TREMORS: 0
MYALGIAS: 0
DEPRESSION: 0
SPEECH CHANGE: 0
BLURRED VISION: 0
NERVOUS/ANXIOUS: 0
BLOOD IN STOOL: 0
WEAKNESS: 0
FOCAL WEAKNESS: 0
COUGH: 0
SENSORY CHANGE: 0
CHILLS: 0
HEADACHES: 1
DOUBLE VISION: 0
DIZZINESS: 1
DIARRHEA: 0
SEIZURES: 0

## 2020-08-24 ASSESSMENT — LIFESTYLE VARIABLES
EVER HAD A DRINK FIRST THING IN THE MORNING TO STEADY YOUR NERVES TO GET RID OF A HANGOVER: NO
TOTAL SCORE: 0
DOES PATIENT WANT TO STOP DRINKING: NO
ALCOHOL_USE: NO
HOW MANY TIMES IN THE PAST YEAR HAVE YOU HAD 5 OR MORE DRINKS IN A DAY: 0
AVERAGE NUMBER OF DAYS PER WEEK YOU HAVE A DRINK CONTAINING ALCOHOL: 0
EVER FELT BAD OR GUILTY ABOUT YOUR DRINKING: NO
PACK_YEARS: PACK A DAY
HAVE PEOPLE ANNOYED YOU BY CRITICIZING YOUR DRINKING: NO
CONSUMPTION TOTAL: NEGATIVE
EVER HAD A DRINK FIRST THING IN THE MORNING TO STEADY YOUR NERVES TO GET RID OF A HANGOVER: NO
HAVE YOU EVER FELT YOU SHOULD CUT DOWN ON YOUR DRINKING: NO
HAVE YOU EVER FELT YOU SHOULD CUT DOWN ON YOUR DRINKING: NO
EVER FELT BAD OR GUILTY ABOUT YOUR DRINKING: NO
CONSUMPTION TOTAL: INCOMPLETE
TOTAL SCORE: 0
HAVE PEOPLE ANNOYED YOU BY CRITICIZING YOUR DRINKING: NO
ALCOHOL_USE: NO
TOTAL SCORE: 0
TOTAL SCORE: 0
ON A TYPICAL DAY WHEN YOU DRINK ALCOHOL HOW MANY DRINKS DO YOU HAVE: 0
EVER_SMOKED: YES

## 2020-08-24 ASSESSMENT — PATIENT HEALTH QUESTIONNAIRE - PHQ9
2. FEELING DOWN, DEPRESSED, IRRITABLE, OR HOPELESS: NOT AT ALL
SUM OF ALL RESPONSES TO PHQ9 QUESTIONS 1 AND 2: 0
1. LITTLE INTEREST OR PLEASURE IN DOING THINGS: NOT AT ALL

## 2020-08-24 ASSESSMENT — FIBROSIS 4 INDEX
FIB4 SCORE: .954545454545454545
FIB4 SCORE: .954545454545454545

## 2020-08-24 NOTE — ED PROVIDER NOTES
ED Provider Note    CHIEF COMPLAINT  No chief complaint on file.  Dizziness    HPI  Wayne Klein is a 55 y.o. male who presents as a transfer for dizziness.  The patient had a vertebral artery aneurysm repaired here at this facility on 20 August.  This morning he was sitting in bed and he turned his head and had the sudden onset of vertigo with some nausea as well as a headache and neck pain.  The patient presented to the emerge department had a CT scan that was negative per the ER doctor and was transferred here for higher level of care for possible aneurysm.  The patient states he does feel better at this time.  He still has little of a headache.  Does not have any continued vertigo.  He has not had any associated chest pain, palpitations, nor difficulty with breathing.    REVIEW OF SYSTEMS  See HPI for further details. All other systems are negative.     PAST MEDICAL HISTORY  Past Medical History:   Diagnosis Date   • Pain 08/2020    neck pain    • Arthritis    • Dental disorder     bottom teeth need dental work    • Heart burn    • Hypertension        FAMILY HISTORY  [unfilled]    SOCIAL HISTORY  Social History     Socioeconomic History   • Marital status:      Spouse name: Not on file   • Number of children: Not on file   • Years of education: Not on file   • Highest education level: Not on file   Occupational History   • Not on file   Social Needs   • Financial resource strain: Not on file   • Food insecurity     Worry: Not on file     Inability: Not on file   • Transportation needs     Medical: Not on file     Non-medical: Not on file   Tobacco Use   • Smoking status: Current Every Day Smoker     Packs/day: 0.25     Years: 40.00     Pack years: 10.00     Types: Cigarettes   • Smokeless tobacco: Former User     Types: Chew   Substance and Sexual Activity   • Alcohol use: Never     Frequency: Never   • Drug use: Never   • Sexual activity: Not on file   Lifestyle   • Physical activity     Days per  week: Not on file     Minutes per session: Not on file   • Stress: Not on file   Relationships   • Social connections     Talks on phone: Not on file     Gets together: Not on file     Attends Yarsanism service: Not on file     Active member of club or organization: Not on file     Attends meetings of clubs or organizations: Not on file     Relationship status: Not on file   • Intimate partner violence     Fear of current or ex partner: Not on file     Emotionally abused: Not on file     Physically abused: Not on file     Forced sexual activity: Not on file   Other Topics Concern   • Not on file   Social History Narrative   • Not on file       SURGICAL HISTORY  Past Surgical History:   Procedure Laterality Date   • BOWEL RESECTION  2018   • ANKLE ORIF Left    • BOWEL RESECTION         CURRENT MEDICATIONS  Home Medications    **Home medications have not yet been reviewed for this encounter**         ALLERGIES  No Known Allergies    PHYSICAL EXAM  VITAL SIGNS: There were no vitals taken for this visit.      Constitutional: Well developed, Well nourished, No acute distress, Non-toxic appearance.   HENT: Normocephalic, Atraumatic, Bilateral external ears normal, Oropharynx moist, No oral exudates, Nose normal.   Eyes: PERRLA, EOMI, Conjunctiva normal, No discharge.   Neck: Normal range of motion, No tenderness, Supple, No stridor.   Lymphatic: No lymphadenopathy noted.   Cardiovascular: Normal heart rate, Normal rhythm, No murmurs, No rubs, No gallops.   Thorax & Lungs: Normal breath sounds, No respiratory distress, No wheezing, No chest tenderness.   Abdomen: Bowel sounds normal, Soft, No tenderness, No masses, No pulsatile masses.   Skin: Warm, Dry, No erythema, No rash.   Back: No tenderness, No CVA tenderness.   Extremities: Intact distal pulses, No edema, No tenderness, No cyanosis, No clubbing.   Neurologic: Alert & oriented x 3, Normal motor function, Normal sensory function, No focal deficits noted.    Psychiatric: Affect normal, Judgment normal, Mood normal.       RADIOLOGY/PROCEDURES  CT-CTA HEAD WITH & W/O-POST PROCESS   Final Result      Status post intracranial distal left vertebral artery aneurysm coil embolization with no evidence of recurrence or adjacent vascular occlusion      CT angiogram of the [place] of [place] within normal limits.      CT-CTA NECK WITH & W/O-POST PROCESSING   Final Result      Normal extracranial vertebral arteries      Mild carotid plaque without significant stenosis or aneurysm      Paraseptal emphysema            COURSE & MEDICAL DECISION MAKING  Pertinent Labs & Imaging studies reviewed. (See chart for details)  This is a 55-year-old male who presents the emergency department with dizziness.  He did have a CT angiogram of the head and neck due to his recent vertebral artery aneurysm coiling.  There does not appear to be any acute bleeding nor complications related to the procedure.  He has been neurologically intact throughout his stay.  The patient could have had an inner ear vertigo causing his symptoms but he also significant neck pain as well as a headache.  Again CT angiogram does not show any evidence of a dissection.  The patient is resting comfortably.  Will admit him to the hospital for observation and further treatment.  I did review his work-up including a CT scan, EKG, and laboratory analysis from the transferring facility all of which were normal.    FINAL IMPRESSION  1.  Dizziness    Disposition  The patient will be admitted in stable condition    Electronically signed by: Jarvis Rollins M.D., 8/24/2020 11:23 AM

## 2020-08-24 NOTE — DISCHARGE PLANNING
Care Transition Team Assessment    Spoke with patient and spouse at bedside with verbal consent. Lives in travel trailor with spouse in [facility]. PCP Corby Villavicencio. Has Medicaid FFS. Uses [facility] in [place]. Spouse will be ride @ D/C.    Information Source  Information Given By: Patient    Readmission Evaluation  Is this a readmission?: No    Interdisciplinary Discharge Planning  Primary Care Physician: Corby Villavicencio  Lives with - Patient's Self Care Capacity: Spouse  Support Systems: Spouse / Significant Other  Housing / Facility: Motor Home  Do You Take your Prescribed Medications Regularly: Yes  Able to Return to Previous ADL's: Yes  Mobility Issues: No  Prior Services: Home-Independent  Patient Expects to be Discharged to:: Home  Assistance Needed: No  Durable Medical Equipment: Not Applicable    Discharge Preparedness  What are your discharge supports?: Spouse  Prior Functional Level: Ambulatory    Functional Assesment  Prior Functional Level: Ambulatory    Finances  Prescription Coverage: Yes    Anticipated Discharge Information  Discharge Disposition: Discharged to home/self care (01)  Discharge Address: [address] [place] [address]  Discharge Contact Phone Number: [phone]

## 2020-08-24 NOTE — NON-PROVIDER
"      Internal Medicine Medical Student Admitting History and Physical  Note Author: Magalys Galloway, Student    Name Wayne Klein     1965   Age/Sex 55 y.o. male   MRN [id number]   Code Status FULL        Chief Complaint:  Dizziness    HPI:    56 y/o male with new onset dizziness with \"the room spinning\" following sudden movement when getting up from petting his dog to turn around and look at the TV a/w nausea without emesis, general weakness, and lightheadedness. PMH is significant for s/p verterbral artery aneurysm coiling 2020, 2yr history of chronic tinnitus, chronic cervicooccipital pain 2/2 degenerative disk disease. Pt reports that vertigo lasted hours, not alleviated by laying down but was not painful. He denied any focal neurological deficits during the epsiode such as dysarthria, loss of vision, loss of hearing, facial drooping, or focal weakness. No associated bowel or bladder incontinence. Pt does not recall any sharp, tearing, or acute pain associated with his neck movement at onset or duration of vertigo. He was flighted from [facility] in [place], [place] and on flight recieved 4mg zofran, 0.5mg versed, and NS 500mL.     Review of Systems   Constitutional: Negative for chills and fever.   HENT: Positive for tinnitus. Negative for hearing loss.    Eyes: Negative for blurred vision, double vision and photophobia.   Gastrointestinal: Negative for blood in stool and vomiting.   Musculoskeletal: Positive for neck pain.        Chronic, intermittent not acute in nature     Neurological: Positive for dizziness and headaches. Negative for tremors, sensory change, speech change, focal weakness and loss of consciousness.             Past Medical History (chronic problems, known complications, current management)     5mm L PICA aneurysm  Chronic neck pain    Diverticulitis    Past Surgical History:  L vertebral artery aneurysm coiling 20  Laparotomy with bowel excision       Medication " Allergy/Sensitivities:  NKA       Family History:  Father - unknown, remote heart disease   Mother - COPD, multiple cerebral aneurysms with no known kidney disease, thyroid disease (unknown)  Sister - thyroid disease unknown, breast cancer     Social History:  Smokin pack year history   Alcohol: Denies alcohol use, quit 18 yrs prior  Illictis: Denies  Other: Retired    Living situation: Lives in [place], [place] with wife. Traveling back and forth between [place] and [place].           Physical Exam     Vitals:    20 1135 20 1137 20 1201 20 1401   BP: 143/71  128/66 121/68   Pulse: 60  (!) 54 (!) 57   Resp: (!) 11  14    Temp: 36.9 °C (98.5 °F)      TempSrc: Temporal      SpO2: 100%  100% 98%   Weight:  84.4 kg (186 lb)       Body mass index is 25.94 kg/m².  /68   Pulse (!) 57   Temp 36.9 °C (98.5 °F) (Temporal)   Resp 14   Wt 84.4 kg (186 lb)   SpO2 98%   BMI 25.94 kg/m²   O2 therapy: Pulse Oximetry: 98 %     Physical Exam   Constitutional: He is oriented to person, place, and time and well-developed, well-nourished, and in no distress. No distress.   HENT:   Head: Normocephalic and atraumatic.   Eyes: Pupils are equal, round, and reactive to light. Conjunctivae and EOM are normal. No scleral icterus.   Neck: Normal range of motion. Neck supple. No JVD present.   Cardiovascular: Normal rate, regular rhythm and normal heart sounds. Exam reveals no gallop and no friction rub.   No murmur heard.  No carotid bruit appreciated    Pulmonary/Chest: Effort normal and breath sounds normal. No respiratory distress. He has no wheezes. He has no rales.   Abdominal: Soft. He exhibits no distension. There is no abdominal tenderness. There is no rebound and no guarding.   Genitourinary:    Prostate normal.   Rectum:      Guaiac result negative.     Neurological: He is alert and oriented to person, place, and time. Gait normal. GCS score is 15.   Skin: Skin is warm and dry. He is not  diaphoretic.       Labs:     FOBT at bedside negative     Imaging   CTA HEAD - patent distal vertebral arteries, coil intact, no evidence of recurrence or adjacent vascular occlusion     CTA NECK -       Assessment/Plan     #Dizziness     Pt reports new onset vertigo with episode lasting a couple of hours without associated focal neurological deficits in setting of known L PICA anuerysm coiling on 8/20/20. The associated nausea improved with zofran and versed, and episode lasting hours without any associated hearing loss, pain, or history of URI, make BPPV high on our list of peripheral vertigo differential. Lack of focal neurological deficits with isolated vertigo make a central etiology unlikely, however his recent endovascular procedure and carotid plaques do not completely preclude TIA or or small lacunar stroke. CTA - head and neck illustrate patent vertebral arteries with no evidence of dissection.     -MRI head to evaluate for small lacunar   -Evaluate with [place]-[facility] and follow with Epply's if symptomatic   -Continue to monitor     #L PICA aneurysm s/p coiling     Pt's L PICA aneurysm found incidentally per pt following episode of lightheadedness, opted for elective endovascular coiling.Per family history, mother also has multiple reported cerebral aneurysms which could be indicative polycystic kidney disease. Will screen to determine if renal ultrasound if applicable.      -UA with microscopy to evaluate for microscopic hematuria and casts  -f/u with renal US if UA postive     #Chronic Neck Pain     Pt reports chronic pain following lifting injury approximately 2 yrs ago that is intermittent, follow up imaging illustrated degenerative disk disease. Denies active discomfort.    -No active intervention, CTM.

## 2020-08-24 NOTE — PROGRESS NOTES
56yo M with recent left VA/PICA aneurysm coiling on 8/20 here with acute neck pain after turning his head abruptly.  He had a CTA Head and neck which were unremarkable.  Has some dizziness and lower BP per ER MD.    Discussed with Dr Rollins.  [facility] IM resident, Dr Bell, to evaluate for admit

## 2020-08-24 NOTE — ED NOTES
Med Rec completed per patient   Allergies reviewed  No ORAL antibiotics in last 14 days    Patient is no longer on Plavix, he was discontinued 8- after surgery

## 2020-08-24 NOTE — PROGRESS NOTES
Pt arrived to unit via wheelchair at 1510. Pt oriented to room, unit, and plan of care. All questions answered at this time. Call light within reach, fall precautions in place, will continue to monitor.

## 2020-08-25 ENCOUNTER — APPOINTMENT (OUTPATIENT)
Dept: CARDIOLOGY | Facility: MEDICAL CENTER | Age: 55
End: 2020-08-25
Attending: STUDENT IN AN ORGANIZED HEALTH CARE EDUCATION/TRAINING PROGRAM
Payer: MEDICAID

## 2020-08-25 VITALS
OXYGEN SATURATION: 98 % | TEMPERATURE: 97.9 F | HEIGHT: 73 IN | BODY MASS INDEX: 24.69 KG/M2 | DIASTOLIC BLOOD PRESSURE: 70 MMHG | RESPIRATION RATE: 16 BRPM | HEART RATE: 66 BPM | SYSTOLIC BLOOD PRESSURE: 158 MMHG | WEIGHT: 186.29 LBS

## 2020-08-25 DIAGNOSIS — I63.019 CEREBROVASCULAR ACCIDENT (CVA) DUE TO THROMBOSIS OF VERTEBRAL ARTERY, UNSPECIFIED BLOOD VESSEL LATERALITY (HCC): ICD-10-CM

## 2020-08-25 LAB
CHOLEST SERPL-MCNC: 147 MG/DL (ref 100–199)
COVID ORDER STATUS COVID19: NORMAL
EKG IMPRESSION: NORMAL
HDLC SERPL-MCNC: 44 MG/DL
INR PPP: 1.04 (ref 0.87–1.13)
LDLC SERPL CALC-MCNC: 75 MG/DL
LV EJECT FRACT  99904: 60
LV EJECT FRACT MOD 2C 99903: 64.7
LV EJECT FRACT MOD 4C 99902: 63.94
LV EJECT FRACT MOD BP 99901: 64.67
PROTHROMBIN TIME: 13.9 SEC (ref 12–14.6)
SARS-COV-2 RNA RESP QL NAA+PROBE: NOTDETECTED
SPECIMEN SOURCE: NORMAL
TRIGL SERPL-MCNC: 140 MG/DL (ref 0–149)
TROPONIN T SERPL-MCNC: <6 NG/L (ref 6–19)
TSH SERPL DL<=0.005 MIU/L-ACNC: 1.64 UIU/ML (ref 0.38–5.33)

## 2020-08-25 PROCEDURE — 93010 ELECTROCARDIOGRAM REPORT: CPT | Performed by: INTERNAL MEDICINE

## 2020-08-25 PROCEDURE — 84443 ASSAY THYROID STIM HORMONE: CPT

## 2020-08-25 PROCEDURE — 93306 TTE W/DOPPLER COMPLETE: CPT | Mod: 26 | Performed by: INTERNAL MEDICINE

## 2020-08-25 PROCEDURE — 700102 HCHG RX REV CODE 250 W/ 637 OVERRIDE(OP): Performed by: STUDENT IN AN ORGANIZED HEALTH CARE EDUCATION/TRAINING PROGRAM

## 2020-08-25 PROCEDURE — A9270 NON-COVERED ITEM OR SERVICE: HCPCS | Performed by: STUDENT IN AN ORGANIZED HEALTH CARE EDUCATION/TRAINING PROGRAM

## 2020-08-25 PROCEDURE — 84484 ASSAY OF TROPONIN QUANT: CPT

## 2020-08-25 PROCEDURE — 93005 ELECTROCARDIOGRAM TRACING: CPT | Performed by: STUDENT IN AN ORGANIZED HEALTH CARE EDUCATION/TRAINING PROGRAM

## 2020-08-25 PROCEDURE — 85610 PROTHROMBIN TIME: CPT

## 2020-08-25 PROCEDURE — 80061 LIPID PANEL: CPT

## 2020-08-25 PROCEDURE — 99217 PR OBSERVATION CARE DISCHARGE: CPT | Mod: GC | Performed by: INTERNAL MEDICINE

## 2020-08-25 PROCEDURE — 93306 TTE W/DOPPLER COMPLETE: CPT

## 2020-08-25 PROCEDURE — G0378 HOSPITAL OBSERVATION PER HR: HCPCS

## 2020-08-25 PROCEDURE — 700111 HCHG RX REV CODE 636 W/ 250 OVERRIDE (IP): Performed by: STUDENT IN AN ORGANIZED HEALTH CARE EDUCATION/TRAINING PROGRAM

## 2020-08-25 PROCEDURE — 96374 THER/PROPH/DIAG INJ IV PUSH: CPT

## 2020-08-25 PROCEDURE — U0003 INFECTIOUS AGENT DETECTION BY NUCLEIC ACID (DNA OR RNA); SEVERE ACUTE RESPIRATORY SYNDROME CORONAVIRUS 2 (SARS-COV-2) (CORONAVIRUS DISEASE [COVID-19]), AMPLIFIED PROBE TECHNIQUE, MAKING USE OF HIGH THROUGHPUT TECHNOLOGIES AS DESCRIBED BY CMS-2020-01-R: HCPCS

## 2020-08-25 RX ORDER — ASPIRIN 81 MG/1
81 TABLET, CHEWABLE ORAL DAILY
Status: DISCONTINUED | OUTPATIENT
Start: 2020-08-25 | End: 2020-08-25 | Stop reason: HOSPADM

## 2020-08-25 RX ORDER — ATORVASTATIN CALCIUM 40 MG/1
40 TABLET, FILM COATED ORAL DAILY
Status: DISCONTINUED | OUTPATIENT
Start: 2020-08-25 | End: 2020-08-25 | Stop reason: HOSPADM

## 2020-08-25 RX ORDER — ONDANSETRON 2 MG/ML
4 INJECTION INTRAMUSCULAR; INTRAVENOUS EVERY 4 HOURS PRN
Status: DISCONTINUED | OUTPATIENT
Start: 2020-08-25 | End: 2020-08-25 | Stop reason: HOSPADM

## 2020-08-25 RX ORDER — ONDANSETRON HYDROCHLORIDE 8 MG/1
8 TABLET, FILM COATED ORAL EVERY 8 HOURS PRN
Qty: 15 TAB | Refills: 0 | Status: SHIPPED | OUTPATIENT
Start: 2020-08-25 | End: 2020-09-03

## 2020-08-25 RX ORDER — ATORVASTATIN CALCIUM 40 MG/1
40 TABLET, FILM COATED ORAL DAILY
Qty: 30 TAB | Refills: 3 | Status: ON HOLD | OUTPATIENT
Start: 2020-08-26 | End: 2021-08-02

## 2020-08-25 RX ORDER — CLOPIDOGREL BISULFATE 75 MG/1
75 TABLET ORAL DAILY
Status: DISCONTINUED | OUTPATIENT
Start: 2020-08-25 | End: 2020-08-25

## 2020-08-25 RX ADMIN — ATORVASTATIN CALCIUM 40 MG: 40 TABLET, FILM COATED ORAL at 11:27

## 2020-08-25 RX ADMIN — ASPIRIN 81 MG: 81 TABLET, CHEWABLE ORAL at 14:35

## 2020-08-25 RX ADMIN — ONDANSETRON 4 MG: 2 INJECTION INTRAMUSCULAR; INTRAVENOUS at 14:55

## 2020-08-25 NOTE — ASSESSMENT & PLAN NOTE
Patient previously on lisinopril 10mg. Orthostatics negative per our exam in ED.  -Holding lisinopril for nomotensive and possible orthostatic component

## 2020-08-25 NOTE — CARE PLAN
Problem: Communication  Goal: The ability to communicate needs accurately and effectively will improve  Outcome: MET     Problem: Safety  Goal: Will remain free from injury  Outcome: MET     Problem: Knowledge Deficit  Goal: Knowledge of disease process/condition, treatment plan, diagnostic tests, and medications will improve  Outcome: MET     Problem: Knowledge Deficit:  Goal: Knowledge of disease process/condition, treatment plan, diagnostic tests, and medications will improve  Outcome: MET
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
## 2020-08-25 NOTE — ASSESSMENT & PLAN NOTE
Recent coil embolization 8/20. Possible complications of procedure can include rupture of aneurysm which typically happens perioperatively, or embolic event post-op, which would be more likely in this patient. TIA or lacunar stroke considered. He has a normal neurologic exam however, so our primary differential is vertigo vs infectious etiology I.e labyrinthitis vs infectious neuritis. Infectious etiology should be considered here as patient did endorse recent congestion, which he attributed to the poor air quality in [place] currently. He also has a family history of aneurysms, so polycystic kidney disease may also be on the differential, although less likely given that he has had scans which do not show other aneurysms.  -MRI brain with and without contrast to evaluate for smaller infarcts caused by embolism  -UA to look for microscopic hematuria, f/u with renal US if indicated for PKD  -[place]-[facility]pike and Epley tomorrow AM to assess vertigo  -PT/OT if needed  -Continue aspirin 81mg daily

## 2020-08-25 NOTE — CONSULTS
Brief Neurology Note    The patient's chart has been reviewed. Case discussed with Dr. Sonny Bell.    MR-BRAIN-WITH & W/O   Final Result      There are small areas of acute infarcts in the left inferior cerebellar hemisphere in the distribution of left posterior inferior cerebellar artery. There are changes secondary to the endovascular repair of left vertebral artery aneurysm. The flow voids    of the intracranial left vertebral and posterior inferior cerebellar arteries are patent.      CT-CTA HEAD WITH & W/O-POST PROCESS   Final Result      Status post intracranial distal left vertebral artery aneurysm coil embolization with no evidence of recurrence or adjacent vascular occlusion      CT angiogram of the [place] of [place] within normal limits.      CT-CTA NECK WITH & W/O-POST PROCESSING   Final Result      Normal extracranial vertebral arteries      Mild carotid plaque without significant stenosis or aneurysm      Paraseptal emphysema      EC-ECHOCARDIOGRAM COMPLETE W/ CONT    (Results Pending)       ____________________________________________________________________________________      The infarct is in the distribution of the vessel addressed via endovascular intervention.  The patient is reported to have a non-focal exam, with NIHSS of 0.  Thus, the stroke can be attributed to iatrogenic etiology.    For continuity of care, a referral has been placed for the stroke bridge clinic.    Please place a formal neurology consult if you have further questions or there is a change in status.    ALAN Hirsch MD  Neurology

## 2020-08-25 NOTE — NON-PROVIDER
"       Internal Medicine Medical Student Note  Note Author: Magalys Galloway, Student    Name Wayne Klein     1965   Age/Sex 55 y.o. male   MRN [id number]   Code Status FULL              Reason for interval visit  (Principal Problem)   Dizziness after extension of neck    Interval Problem Daily Status Update  (problem status, last 24 hours, new history, new data )     This morning pt reports multiple episodes of vertigo that were triggered by positional movements - mostly head turning, or the turning of corners when transported to MRI. Each of these episodes lasted approximately 10 minutes, with some requiring anti-nausea medication. He denies any ear pain, and when asked this morning 20 any recent history of upper respiratory infection. He denies any episodes of emesis, rhinorrhea, or new focal deficits.         Physical Exam       Vitals:    20 1521 20 2006 20 2337 20 0334   BP: 124/68 127/72 122/67 135/78   Pulse: 62 65 (!) 55 66   Resp: 18 18 17 18   Temp: 36.6 °C (97.9 °F) 36.6 °C (97.8 °F) 36.6 °C (97.8 °F) 36.6 °C (97.8 °F)   TempSrc: Temporal Temporal Temporal Temporal   SpO2: 97% 95% 95% 96%   Weight: 84.5 kg (186 lb 4.6 oz)      Height: 1.854 m (6' 1\")        Body mass index is 24.58 kg/m². Weight: 84.5 kg (186 lb 4.6 oz)  Oxygen Therapy:  Pulse Oximetry: 96 %, O2 (LPM): 0, O2 Delivery Device: None - Room Air    Physical Exam    Neuro: CN 2-12 intact, equally and bilaterally. Coldiron-Hallpike maneuver did not elicit nystagmus or reproduce symptoms. Romberg negative.    HEENT: neg rhinorrhea,. TM translucent, intact, non erythematous bilaterally. L TM round scar present.   CV: RRR, no murmurs rubs or gallops. Regular S1, S2. No carotid bruit appreciated.      Lab: UA neg for occult hematuria    MRI of head illustrated mulitple, small hyperintensities in the L cerebellum. 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 The associated nausea improved with zofran and versed, and episode lasting hours without any focal neurological deficits, associated hearing loss, pain, or history of URI, make BPPV high on our list of peripheral vertigo differential initially. However his recent endovascular procedure and carotid plaques did not completely preclude TIA or or small lacunar stroke, and MRI confirmed areas of acute infarct in the L inferior cerebellum. Distribution of infarct corresponded with vascular distribution of the L PICA which supports that this was likely 2/2 aneurysm coiling.     -continue ASA 81mg   -initiate atorvastatin 40mg QD  -f/u outpt neurology       #L PICA aneurysm s/p coiling      Pt's L PICA aneurysm found incidentally per pt following episode of lightheadedness, opted for elective endovascular coiling.Per family history, mother also has multiple reported cerebral aneurysms. Negative occult hematuria, PCKD unlikely.    -UA 8/24/20 neg, no renal US needed      #Chronic Neck Pain      Pt reports chronic pain following lifting injury approximately 2 yrs ago that is intermittent, follow up imaging illustrated degenerative disk disease. Denies active discomfort.     -No active intervention, CTM.    #HTN     Pt started on lisinopril 10mg QD for HTN. Held during hospital admission to allow increased BP for maximal cerebral profusion.     - restart lisinopril after dispo 8/26/20

## 2020-08-25 NOTE — DISCHARGE INSTRUCTIONS
Discharge Instructions    Discharged to home by car with relative. Discharged via walking, hospital escort: Yes.  Special equipment needed: Not Applicable    Be sure to schedule a follow-up appointment with your primary care doctor or any specialists as instructed.     Discharge Plan:   Diet Plan: Discussed  Activity Level: Discussed  Smoking Cessation Offered: Patient Refused  Confirmed Follow up Appointment: Patient to Call and Schedule Appointment  Confirmed Symptoms Management: Discussed  Medication Reconciliation Updated: Yes    I understand that a diet low in cholesterol, fat, and sodium is recommended for good health. Unless I have been given specific instructions below for another diet, I accept this instruction as my diet prescription.   Other diet: Regular Diet    Special Instructions: None    · Is patient discharged on Warfarin / Coumadin?   No     Depression / Suicide Risk    As you are discharged from this [facility] facility, it is important to learn how to keep safe from harming yourself.    Recognize the warning signs:  · Abrupt changes in personality, positive or negative- including increase in energy   · Giving away possessions  · Change in eating patterns- significant weight changes-  positive or negative  · Change in sleeping patterns- unable to sleep or sleeping all the time   · Unwillingness or inability to communicate  · Depression  · Unusual sadness, discouragement and loneliness  · Talk of wanting to die  · Neglect of personal appearance   · Rebelliousness- reckless behavior  · Withdrawal from people/activities they love  · Confusion- inability to concentrate     If you or a loved one observes any of these behaviors or has concerns about self-harm, here's what you can do:  · Talk about it- your feelings and reasons for harming yourself  · Remove any means that you might use to hurt yourself (examples: pills, rope, extension cords, firearm)  · Get professional help from the community  (Mental Health, Substance Abuse, psychological counseling)  · Do not be alone:Call your Safe Contact- someone whom you trust who will be there for you.  · Call your local CRISIS HOTLINE 480-1202 or 937-461-2213  · Call your local Children's Mobile Crisis Response Team [place] (634) 563-5828 or www.SMX  · Call the toll free National Suicide Prevention Hotlines   · National Suicide Prevention Lifeline 399-439-WVFY (4227)  · National Hope Line Network 800-SUICIDE (193-7113)

## 2020-08-25 NOTE — DISCHARGE SUMMARY
Discharge Summary    CHIEF COMPLAINT ON ADMISSION  Chief Complaint   Patient presents with   • Dizziness     and nausea. Sudden onset at 0800 this am   • Neck Pain     sudden onset this morning. Reports vertebral artery aneurysm repair last week at [facility].       Reason for Admission  EMS     Admission Date  8/24/2020    CODE STATUS  Full Code    HPI & HOSPITAL COURSE  This is a 55 y.o. male with past medical history of PICA aneurysm s/p coil embolization on 8/20, chronic dizziness and neck pain, here with sudden onset dizziness, headache, and neck pain caused by sudden turning of his neck.     DIZZINESS  Initial differential included stroke, benign paroxysmal positional vertigo, labyrinthitis, or vestibular neuritis. We ordered an MRI for suspected intracranial pathology including embolic event secondary to recent coil embolization. We assessed the patient and found him to have a completely intact neurological exam with no focal deficits. He continued to endorse postural dizziness which was improved since his presentation to the outside facility. On day #2 of hospitalization, his MRI showed multiple areas of small infarcts in the L cerebellar hemisphere in the distribution of the L PICA, which is most likely secondary to his recent coil embolization procedure on 8/20. Neurology was consulted and recommended outpatient follow-up, with continuation of his daily aspirin in the meantime. We did further stroke workup with an echocardiogram, lipid panel, PT/INR, TSH, all of which were normal. We discharged the patient with instructions to continue his aspirin 81mg daily, as well as starting a 40mg daily atorvastatin for secondary prevention of future stroke. We also gave him a supply of ondansetron PO for episodic nausea.    HYPERTENSION  We held his lisinopril 10mg as we wanted to keep his pressures permissively high (goal <220/120) in the setting of possible acute stroke. He can restart this on the day after discharge  as he will be outside the 48-hour window for permissive hypertension at that point.    CHEST PAIN  The patient endorsed episodic chest pain lasting 3-4 minutes overnight. Initial troponin negative, EKG showing no ischemic changes. Likely caused by anxiety and hospitalization. Patient instructed to follow up with PCP and consider cardiology workup with myocardial perfusion study if the pain reoccurs.       Therefore, he is discharged in good and stable condition to home with close outpatient follow-up.      Discharge Date  8/25/2020    FOLLOW UP ITEMS POST DISCHARGE  Follow up with neurology stroke [facility]    DISCHARGE DIAGNOSES  Principal Problem:    Dizziness after extension of neck POA: Unknown  Active Problems:    Hypertension POA: Yes  Resolved Problems:    * No resolved hospital problems. *      FOLLOW UP  No future appointments.  No follow-up provider specified.    MEDICATIONS ON DISCHARGE     Medication List      START taking these medications      Instructions   atorvastatin 40 MG Tabs  Start taking on: August 26, 2020  Commonly known as: LIPITOR   Take 1 Tab by mouth every day.  Dose: 40 mg     ondansetron 8 MG Tabs  Commonly known as: Zofran   Take 1 Tab by mouth every 8 hours as needed for Nausea/Vomiting.  Dose: 8 mg        CONTINUE taking these medications      Instructions   aspirin 81 MG Chew chewable tablet  Commonly known as: ASA   Take 81 mg by mouth every day.  Dose: 81 mg     lisinopril 10 MG Tabs  Commonly known as: PRINIVIL   Take 1 Tab by mouth every day.  Dose: 10 mg        STOP taking these medications    acetaminophen 325 MG Tabs  Commonly known as: TYLENOL     clopidogrel 75 MG Tabs  Commonly known as: PLAVIX            Allergies  No Known Allergies    DIET  Orders Placed This Encounter   Procedures   • Diet Order Regular     Standing Status:   Standing     Number of Occurrences:   1     Order Specific Question:   Diet:     Answer:   Regular [1]       ACTIVITY  As tolerated.  Weight  bearing as tolerated    CONSULTATIONS  Neurology    PROCEDURES  MRI Brain 8/25  IMPRESSION:     There are small areas of acute infarcts in the left inferior cerebellar hemisphere in the distribution of left posterior inferior cerebellar artery. There are changes secondary to the endovascular repair of left vertebral artery aneurysm. The flow voids of the intracranial left vertebral and posterior inferior cerebellar arteries are patent.    Echocardiogram 8/25  CONCLUSIONS  Normal left ventricular size, wall thickness, systolic, and diastolic   function.  The right ventricle was normal in size and function.  No significant valvular pathology.  Agitated saline (bubble) study was performed. No evidence of right to   left shunt by agitated saline challenge.  Normal pericardium without effusion.    CTA Neck 8/24  IMPRESSION:     Normal extracranial vertebral arteries     Mild carotid plaque without significant stenosis or aneurysm     Paraseptal emphysema    CTA Head 8/24  IMPRESSION:     Status post intracranial distal left vertebral artery aneurysm coil embolization with no evidence of recurrence or adjacent vascular occlusion     CT angiogram of the [place] within normal limits.      PHYSICAL EXAM  Vitals:    08/25/20 0913   BP: 158/70   Pulse: 66   Resp: 16   Temp: 36.6 °C (97.9 °F)   SpO2: 98%     LABORATORY  Lab Results   Component Value Date    SODIUM 138 08/21/2020    POTASSIUM 4.1 08/21/2020    CHLORIDE 103 08/21/2020    CO2 22 08/21/2020    GLUCOSE 98 08/21/2020    BUN 16 08/21/2020    CREATININE 0.82 08/21/2020        Lab Results   Component Value Date    WBC 5.9 08/21/2020    HEMOGLOBIN 14.5 08/21/2020    HEMATOCRIT 43.1 08/21/2020    PLATELETCT 176 08/21/2020      Physical Exam   Constitutional: He is oriented to person, place, and time and well-developed, well-nourished, and in no distress. No distress.   HENT:   Head: Normocephalic and atraumatic.   Eyes: Pupils are equal, round, and reactive to  light. Conjunctivae and EOM are normal.   Neck: Normal range of motion. Neck supple.   Cardiovascular: Normal rate and regular rhythm. Exam reveals no gallop and no friction rub.   No murmur heard.  Pulmonary/Chest: Effort normal and breath sounds normal. He has no wheezes. He has no rales.   Abdominal: Soft. Bowel sounds are normal. He exhibits no distension. There is no abdominal tenderness. There is no guarding.   Genitourinary: Rectum:      Guaiac result negative.     Musculoskeletal: Normal range of motion.   Neurological: He is alert and oriented to person, place, and time. No cranial nerve deficit. Gait normal. Coordination normal. GCS score is 15.   Skin: Skin is warm and dry.     Total time of the discharge process exceeds 60 minutes.

## 2020-08-27 ENCOUNTER — PATIENT OUTREACH (OUTPATIENT)
Dept: HEALTH INFORMATION MANAGEMENT | Facility: OTHER | Age: 55
End: 2020-08-27

## 2020-08-31 NOTE — PROGRESS NOTES
Chief Complaint   Patient presents with   • New Patient     had a stroke       Problem List Items Addressed This Visit     None      Visit Diagnoses     Ischemic stroke (HCC)        History of cerebral aneurysm repair        History of cigarette smoking              History of present illness:  Wayne Klein 55 y.o. male presents today for [facility].  PMHx: HTN, hx of PICA aneurysm     Patient was seen in hospital on 8/24/2020 for complaints of dizziness, headache and neck pain. Denies unilateral weakness, numbness of tingling, facial droop, slurred speech. He was seen initially at [facility] and was transported to [facility]. Not a candidate for TPA. He was told he had a stroke and was discharged on baby asa, lipitor and lisinoprill. He reports that he continues to have dizziness like the room is spinning and sometimes he gets nauseated. Worse with  head or body movement. He gets dizzy sitting down. He admits to having vertigo. He also mention that his dizziness may be related to low BP as he has not taken his BP med today and his BP was still low. He has not checked his BP during this events. He states he can have SBP up to 170s.  Denies any headaches.     Quit smoking a month ago. No alcohol use. No recreational drug use.     He has seen PCP already and will be seeing him again soon. He has a f/u with Dr. Ellington after coiling.       Past medical history:   Past Medical History:   Diagnosis Date   • Arthritis    • Dental disorder     bottom teeth need dental work    • Heart burn    • Hypertension    • Pain 08/2020    neck pain        Past surgical history:   Past Surgical History:   Procedure Laterality Date   • BOWEL RESECTION  2018   • ANKLE ORIF Left    • BOWEL RESECTION         Family history:   History reviewed. No pertinent family history.    Social history:   Social History     Socioeconomic History   • Marital status:      Spouse name: Not on file   • Number of children: Not on file    • Years of education: Not on file   • Highest education level: Not on file   Occupational History   • Not on file   Social Needs   • Financial resource strain: Not on file   • Food insecurity     Worry: Not on file     Inability: Not on file   • Transportation needs     Medical: Not on file     Non-medical: Not on file   Tobacco Use   • Smoking status: Former Smoker     Packs/day: 0.25     Years: 40.00     Pack years: 10.00     Types: Cigarettes     Quit date: 8/10/2020     Years since quittin.0   • Smokeless tobacco: Former User     Types: Chew   Substance and Sexual Activity   • Alcohol use: Never     Frequency: Never   • Drug use: Never   • Sexual activity: Not on file   Lifestyle   • Physical activity     Days per week: Not on file     Minutes per session: Not on file   • Stress: Not on file   Relationships   • Social connections     Talks on phone: Not on file     Gets together: Not on file     Attends Denominational service: Not on file     Active member of club or organization: Not on file     Attends meetings of clubs or organizations: Not on file     Relationship status: Not on file   • Intimate partner violence     Fear of current or ex partner: Not on file     Emotionally abused: Not on file     Physically abused: Not on file     Forced sexual activity: Not on file   Other Topics Concern   • Not on file   Social History Narrative   • Not on file       Current medications:   Current Outpatient Medications   Medication   • atorvastatin (LIPITOR) 40 MG Tab   • ondansetron (ZOFRAN) 8 MG Tab   • aspirin (ASA) 81 MG Chew Tab chewable tablet   • lisinopril (PRINIVIL) 10 MG Tab     No current facility-administered medications for this visit.        Medication Allergy:  No Known Allergies    Review of systems:   General: Denies fevers or chills, or nightsweats, or generalized fatigue.    Head: Denies headaches or lightheadedness. +dizziness   EENT: Denies vision changes, vision loss or pain, nasal secretion,  "nasal bleeding, difficulty swallowing, hearing loss, tinnitus, vertigo, ear pain  Respiratory: Denies shortness of breath, cough, sputum, or wheezing  Cardiac: Denies chest pain, palpitations, edema or syncope  Gastrointestinal: Denies nausea, vomiting, no abdominal pain or change in bowel habits, no melena or hematochezia  Urinary: Denies dysuria, frequency, hesitancy, or incontinence.  Dermatologic:  Denies new rash  Musculoskeletal: Denies muscle pain or swelling, no atrophy, no neck and back pain or stiffness.   Neurologic: Denies facial droopiness, muscle weakness (focal or generalized), paresthesias, change in speech or language, memory loss, abnormal movements, seizures, loss of consciousness, or episodes of confusion. + ataxia   Psychiatric: Denies anxiety, depression, mood swings, suicidal or homicidal thoughts        Physical examination:   Vitals:    09/03/20 1414   BP: 110/62   BP Location: Right arm   Patient Position: Sitting   BP Cuff Size: Adult   Pulse: 70   Resp: 16   Temp: 37.3 °C (99.1 °F)   TempSrc: Temporal   SpO2: 96%   Weight: 85 kg (187 lb 6.3 oz)   Height: 1.854 m (6' 1\")     General: Patient in no acute distress, pleasant and cooperative.  HEENT: Normocephalic, no signs of acute trauma.   moist conjunctivae. Nares are patent. Oropharynx clear without lesions and normal  hard and soft palates.   Neck: Supple. There is normal range of motion.   Resp: clear to auscultation bilaterally. No wheezes or crackles.   CV: RRR, no murmurs.   Skin: no signs of acute rashes or trauma.   Musculoskeletal: joints exhibit full range of motion, without any pain to palpation. There are no signs of joint or muscle swelling. There is no tenderness to deep palpation of muscles.   Psychiatric: No hallucinatory behavior. No symptoms of depression or suicidal ideation. Mood and affect appear normal on exam.     NEUROLOGICAL EXAM:   Mental status, orientation: Awake, alert and fully oriented.   Speech and language: " speech is clear and fluent. The patient is able to name, repeat and comprehend.   Memory: There is intact recollection of recent and remote events.   Cranial nerve exam:   CN I: Not examined   CN II: PERRL.   CN III, IV, VI: EOMI; no nystagmus   CN V: Facial sensation intact bilaterally   CN VII: face symmetric   CN VIII: hearing intact to finger rub bilaterally   CN IX, X: palate elevates symmetrically   CN XI: Symmetric shoulder shrug  CN XII: tongue midline. No signs of tongue biting or fasciculations   Motor exam: Strength is 5/5 in all extremities. Tone is normal. No abnormal movements were seen on exam.   Sensory exam reveals normal sense of light touch in all extremities.   Deep tendon reflexes:  2+ throughout. Plantar responses are flexor. There is no clonus.   Coordination: shows a normal finger-nose-finger. Normal rapidly alternating movements.   Gait: The patient was able to get up from seated position on first attempt without requiring assistance. Found to be steady when walking. Movements were fluid with normal arm swing. The patient was able to turn without difficulties or tendency to fall. Romberg exam unremarkable      ANCILLARY DATA REVIEWED:     Lab Data Review:  Reviewed in chart.  CBC, CMP, lipid    Records reviewed:  Reviewed in chart.    Imaging:  MRI brain with and without 8/24/2020  There are small areas of acute infarcts in the left inferior cerebellar hemisphere in the distribution of left posterior inferior cerebellar artery. There are changes secondary to the endovascular repair of left vertebral artery aneurysm. The flow voids   of the intracranial left vertebral and posterior inferior cerebellar arteries are patent.    CTA neck 8/24/2020   Normal extracranial vertebral arteries     Mild carotid plaque without significant stenosis or aneurysm     Paraseptal emphysema    CTA head 8/24/2020  Status post intracranial distal left vertebral artery aneurysm coil embolization with no evidence  of recurrence or adjacent vascular occlusion     CT angiogram of the [place] within normal limits.    IR embolization-left intracranial vertebral artery aneurysm 8/19/2020  1.  Unruptured approximately 4 x 2.5 mm sized distal intracranial vertebral artery aneurysm.  2.  Successful endovascular repair- coiling.    Echocardiogram 8/25/2020  CONCLUSIONS  Normal left ventricular size, wall thickness, systolic, and diastolic   function.  The right ventricle was normal in size and function.  No significant valvular pathology.  Agitated saline (bubble) study was performed. No evidence of right to   left shunt by agitated saline challenge.  Normal pericardium without effusion.    ASSESSMENT AND PLAN:    1. Ischemic stroke (HCC)    2. History of cerebral aneurysm repair    3. History of cigarette smoking    4. Dizzinesses    5. Hospital discharge follow-up    6. Essential hypertension      NIH Stroke Scale:     1a. Level of Consciousness (Alert, drowsy, etc): 0= Alert     1b. LOC Questions (Month, age): 0= Answers both correctly     1c. LOC Commands (Open/close eyes make fist/let go): 0= Obeys both correctly     2.   Best Gaze (Eyes open - patient follows examiner's finger on face): 0= Normal     3.   Visual Fields (introduce visual stimulus/threat to patient's field quadrants): 0= No visual loss    4.   Facial Paresis (Show teeth, raise eyebrows and squeeze eyes shut): 0= Normal               5a. Motor Arm - Left (Elevate arm to 90 degrees if patient is sitting, 45 degrees if  supine): 0= No drift     5b. Motor Arm - Right (Elevate arm to 90 degrees if patient is sitting, 45 degrees if supine): 0= No drift     6a. Motor Leg - Left (Elevate leg 30 degrees with patient supine): 0= No drift     6b. Motor Leg - Right  (Elevate leg 30 degrees with patient supine): 0= No drift     7.   Limb Ataxia (Finger-nose, heel down shin): 0= No ataxia     8.   Sensory (Pin prick to face, arm, trunk and leg - compare side to side):  0= Normal     9.  Best Language (Name item, describe a picture and read sentences): 0= No aphasia     10. Dysarthria (Evaluate speech clarity by patient repeating listed words): 0= Normal articulation     11. Extinction and Inattention (Use information from prior testing to identify neglect or  double simultaneous stimuli testing): 0= No neglect     Total NIH Score: 0          CLINICAL DECISION:  Infarctions in the left cerebellar hemisphere (PICA) is likely iatrogenic in etiology given status post left vertebral artery aneurysm coil embolization. Head and neck vessel imaging showed no significant stenosis. Echo was normal with no evidence of PFO. He continues to have vertigo-like dizziness which could be residual of the stroke but this could also overlap with his positional vertigo. Trial of meclizine 12.5mg BID PRN given and recommended to f/u with PCP or go to urgent care if this continues to persist. Recommended monitoring BP a couple times a day especially during his dizzy spells as his dizziness may also be related to fluctuation in BP. His SBP (per pt) normally ranges from 150's-170s and today his SBP was 110. He may be having a fluctuation in BP that needs to be addressed.       Plan:  -Continue current medications. Aware of bleeding risk.     -Education given to help control risk factors including:  HTN and HLD. Goal: /80, LDL 70 or less    -Needs close fu with PCP for optimal control of risk factors.     -continue avoiding cigarettes use    -Continue f/u with Dr. Ellington    -Discussed diet and exercise. Limiting intake of sugar and carbohydrates        FOLLOW-UP:   Return no further f/u.        EDUCATION AND COUNSELING:  -Usefulness of anti-platelets in secondary stroke prevention was discussed. The side effects, including increased risk for bleeding (both traumatic and spontaneous) were reviewed with the patient at length.   -Recommended goal for LDL is 70 or less. Side effects of statin, including  idiosyncratic reactions as well as more common myalgias, and liver injury  were discussed. Monitoring of LFT’s will be deferred to the patient’s primary care physician.   -Secondary stroke prevention education was provided, including; lifestyle modification with healthy diet, and exercise, as well as recommendations for optimal control of risk factors.   -Close follow up with PCP is recommended.   -Compliance with medications was discussed in detail.   -Smoking cessation education was provided for greater than 3 minutes.   -The patient/family was educated on recognition of stroke symptoms. The patient/family instructed to call 911 EMERGENTLY upon presentation of any of these symptoms/signs, to be taken to nearest emergency department for evaluation and acute care.           Sheela Marx, MSN, APRN, FNP-C  [facility]  Office: [phone]  Fax: [phone]

## 2020-09-02 SDOH — ECONOMIC STABILITY: TRANSPORTATION INSECURITY
IN THE PAST 12 MONTHS, HAS THE LACK OF TRANSPORTATION KEPT YOU FROM MEDICAL APPOINTMENTS OR FROM GETTING MEDICATIONS?: NO

## 2020-09-02 SDOH — ECONOMIC STABILITY: INCOME INSECURITY: HOW HARD IS IT FOR YOU TO PAY FOR THE VERY BASICS LIKE FOOD, HOUSING, MEDICAL CARE, AND HEATING?: NOT HARD AT ALL

## 2020-09-02 SDOH — ECONOMIC STABILITY: FOOD INSECURITY: WITHIN THE PAST 12 MONTHS, YOU WORRIED THAT YOUR FOOD WOULD RUN OUT BEFORE YOU GOT MONEY TO BUY MORE.: NEVER TRUE

## 2020-09-02 SDOH — ECONOMIC STABILITY: TRANSPORTATION INSECURITY
IN THE PAST 12 MONTHS, HAS LACK OF TRANSPORTATION KEPT YOU FROM MEETINGS, WORK, OR FROM GETTING THINGS NEEDED FOR DAILY LIVING?: NO

## 2020-09-02 SDOH — ECONOMIC STABILITY: FOOD INSECURITY: WITHIN THE PAST 12 MONTHS, THE FOOD YOU BOUGHT JUST DIDN'T LAST AND YOU DIDN'T HAVE MONEY TO GET MORE.: NEVER TRUE

## 2020-09-02 NOTE — PROGRESS NOTES
Community Health Worker Intake    • Social determinates of health intake completed.   • Identified barriers to none.  • Contact information provided to Wayne Klein   • Has PCP appointment scheduled for: Thursday, Sept 3 at 2:40 pm  • Outpatient assessment completed.  • Did the patient receive medications post discharge: Yes    CHW Jas reached out to pt via TC to introduce CCM services and f/u after d/c. Pt indicated he was doing well but has been feeling dizzy. He indicated having a f/u appt tomorrow and will bring this issue up to the provider. For transportation to appts, pt can usually drive himself but has been needing his wife to drive him lately due to the dizziness. He does not have trouble paying for resources such as food, housing or medical care. Hi ssupport system consist of family and friends. He is currently living with his wife in a trailer home and receives disability benefits. Pt indicated feeling confident in managing his health and does not need additional resources or services at this time.     Plan: CHW will d/c pt from CCM services due to needs met 9/2.

## 2020-09-03 ENCOUNTER — OFFICE VISIT (OUTPATIENT)
Dept: NEUROLOGY | Facility: MEDICAL CENTER | Age: 55
End: 2020-09-03
Payer: MEDICAID

## 2020-09-03 VITALS
TEMPERATURE: 99.1 F | DIASTOLIC BLOOD PRESSURE: 62 MMHG | SYSTOLIC BLOOD PRESSURE: 110 MMHG | BODY MASS INDEX: 24.84 KG/M2 | RESPIRATION RATE: 16 BRPM | WEIGHT: 187.39 LBS | OXYGEN SATURATION: 96 % | HEART RATE: 70 BPM | HEIGHT: 73 IN

## 2020-09-03 DIAGNOSIS — Z98.890 HISTORY OF CEREBRAL ANEURYSM REPAIR: ICD-10-CM

## 2020-09-03 DIAGNOSIS — I63.9 ISCHEMIC STROKE (HCC): ICD-10-CM

## 2020-09-03 DIAGNOSIS — R42 DIZZINESSES: ICD-10-CM

## 2020-09-03 DIAGNOSIS — Z86.79 HISTORY OF CEREBRAL ANEURYSM REPAIR: ICD-10-CM

## 2020-09-03 DIAGNOSIS — Z87.891 HISTORY OF CIGARETTE SMOKING: ICD-10-CM

## 2020-09-03 DIAGNOSIS — Z09 HOSPITAL DISCHARGE FOLLOW-UP: ICD-10-CM

## 2020-09-03 DIAGNOSIS — I10 ESSENTIAL HYPERTENSION: ICD-10-CM

## 2020-09-03 PROCEDURE — 99215 OFFICE O/P EST HI 40 MIN: CPT | Performed by: NURSE PRACTITIONER

## 2020-09-03 RX ORDER — MECLIZINE HCL 12.5 MG/1
12.5 TABLET ORAL 2 TIMES DAILY PRN
Qty: 10 TAB | Refills: 0 | Status: SHIPPED | OUTPATIENT
Start: 2020-09-03

## 2020-09-03 ASSESSMENT — FIBROSIS 4 INDEX: FIB4 SCORE: .954545454545454545

## 2020-09-03 ASSESSMENT — PATIENT HEALTH QUESTIONNAIRE - PHQ9: CLINICAL INTERPRETATION OF PHQ2 SCORE: 0

## 2020-09-09 ASSESSMENT — ENCOUNTER SYMPTOMS
SPEECH CHANGE: 0
WEIGHT LOSS: 0
CONSTITUTIONAL NEGATIVE: 1
SENSORY CHANGE: 0
WEAKNESS: 0
FEVER: 0
DIAPHORESIS: 0
FOCAL WEAKNESS: 0
GASTROINTESTINAL NEGATIVE: 1
BRUISES/BLEEDS EASILY: 0
CHILLS: 0

## 2020-09-09 NOTE — CONSULTS
Neuro Interventional Service Consultation      Re: Wayne Klein     MRN: [id number]   : 1965    Wayne Klein was referred to our service by Jaya Garcia MD for intervention of an incidental intracranial aneurysm.  He is seen in clinic for follow up after aneurysm intervention. He is also under the care of Corby Villavicencio DO.    History of Present Illness:   initially presented on  and again on  to the [facility] ED with complaints of lightheadedness and dizziness with standing and walking for several days. He also complained of a headache in his usual pattern but unrelieved with Aleve. He had no focal weakness or dysarthria. 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  He was referred to the Neuro Interventional Service for evaluation and management of this finding. He underwent coil embolization on . His recovery was remarkable for post procedure angio site bleeding that necessitated and additional day in the hospital. He was discharged on  with instructions to stop Plavix but continue aspirin. He then developed severe dizziness on  and nausea and was brought to [facility] for evaluation. The symptoms resolved, but imaging showed diffusion abnormalities in the left PICA territory consistent with thrombus in the vessel adjacent to the coiled aneurysm.     He is seen today for review of imaging studies and follow up after aneurysm coiling. Today, the patient reports improvement in his vertigo. He has been taking meclizine with good effect. He has some intermittent dizziness but no episodes yesterday. He has ongoing headaches in the same pattern for many years, which he attributes to neck and back problems, and has a follow up with his PCP next month about this. There is a significant family history of aneurysms with his mother an paternal uncle suffering intracranial aneurysms and  aortic aneurysms in his father, brother, and sister. Blood pressure is controlled and he monitors it at home. He was placed on a statin for his stroke. He is taking a daily aspirin as instructed. He quit smoking since his stroke, with history of a pack per day for many years. He has not had alcohol in about 18 years. He denies current or past methamphetamine use. He is not accompanied by his wife to the appointment.     Past Medical History:   Diagnosis Date   • Arthritis    • Dental disorder     bottom teeth need dental work    • Heart burn    • Hypertension    • Pain 2020    neck pain      Past Surgical History:   Procedure Laterality Date   • BOWEL RESECTION  2018   • ANKLE ORIF Left    • BOWEL RESECTION       Social History     Socioeconomic History   • Marital status:      Spouse name: Not on file   • Number of children: Not on file   • Years of education: Not on file   • Highest education level: Not on file   Occupational History   • Not on file   Social Needs   • Financial resource strain: Not hard at all   • Food insecurity     Worry: Never true     Inability: Never true   • Transportation needs     Medical: No     Non-medical: No   Tobacco Use   • Smoking status: Former Smoker     Packs/day: 0.25     Years: 40.00     Pack years: 10.00     Types: Cigarettes     Quit date: 8/10/2020     Years since quittin.0   • Smokeless tobacco: Former User     Types: Chew   Substance and Sexual Activity   • Alcohol use: Never     Frequency: Never   • Drug use: Never   • Sexual activity: Not on file   Lifestyle   • Physical activity     Days per week: Not on file     Minutes per session: Not on file   • Stress: Not on file   Relationships   • Social connections     Talks on phone: Not on file     Gets together: Not on file     Attends [employer] service: Not on file     Active member of club or organization: Not on file     Attends meetings of clubs or organizations: Not on file     Relationship status: Not on  file   • Intimate partner violence     Fear of current or ex partner: Not on file     Emotionally abused: Not on file     Physically abused: Not on file     Forced sexual activity: Not on file   Other Topics Concern   • Not on file   Social History Narrative   • Not on file     No family history on file.    Review of Systems   Constitutional: Negative.  Negative for chills, diaphoresis, fever, malaise/fatigue and weight loss.   Gastrointestinal: Negative.  Negative for nausea.   Neurological: Positive for dizziness. Negative for sensory change, speech change, focal weakness and weakness.   Endo/Heme/Allergies: Does not bruise/bleed easily.   Psychiatric/Behavioral: Negative for substance abuse (negative for alcohol and recent tobacco cessation).     A comprehensive 14-point review of systems was negative except as described above.     Labs:      Ref. Range 8/21/2020 04:00   WBC Latest Ref Range: 4.8 - 10.8 K/uL 5.9   RBC Latest Ref Range: 4.70 - 6.10 M/uL 4.49 (L)   Hemoglobin Latest Ref Range: 14.0 - 18.0 g/dL 14.5   Hematocrit Latest Ref Range: 42.0 - 52.0 % 43.1   MCV Latest Ref Range: 81.4 - 97.8 fL 96.0   MCH Latest Ref Range: 27.0 - 33.0 pg 32.3   MCHC Latest Ref Range: 33.7 - 35.3 g/dL 33.6 (L)   RDW Latest Ref Range: 35.9 - 50.0 fL 48.0   Platelet Count Latest Ref Range: 164 - 446 K/uL 176   MPV Latest Ref Range: 9.0 - 12.9 fL 9.8   Sodium Latest Ref Range: 135 - 145 mmol/L 138   Potassium Latest Ref Range: 3.6 - 5.5 mmol/L 4.1   Chloride Latest Ref Range: 96 - 112 mmol/L 103   Co2 Latest Ref Range: 20 - 33 mmol/L 22   Anion Gap Latest Ref Range: 7.0 - 16.0  13.0   Glucose Latest Ref Range: 65 - 99 mg/dL 98   Bun Latest Ref Range: 8 - 22 mg/dL 16   Creatinine Latest Ref Range: 0.50 - 1.40 mg/dL 0.82   GFR If  Latest Ref Range: >60 mL/min/1.73 m 2 >60   GFR If Non  Latest Ref Range: >60 mL/min/1.73 m 2 >60   Calcium Latest Ref Range: 8.5 - 10.5 mg/dL 8.7   AST(SGOT) Latest Ref  Range: 12 - 45 U/L 9 (L)   ALT(SGPT) Latest Ref Range: 2 - 50 U/L 9   Alkaline Phosphatase Latest Ref Range: 30 - 99 U/L 69   Total Bilirubin Latest Ref Range: 0.1 - 1.5 mg/dL 0.6   Albumin Latest Ref Range: 3.2 - 4.9 g/dL 3.6   Total Protein Latest Ref Range: 6.0 - 8.2 g/dL 5.8 (L)   Globulin Latest Ref Range: 1.9 - 3.5 g/dL 2.2   A-G Ratio Latest Units: g/dL 1.6   Magnesium Latest Ref Range: 1.5 - 2.5 mg/dL 2.1      Ref. Range 8/25/2020 11:35 8/25/2020 14:35   Cholesterol,Tot Latest Ref Range: 100 - 199 mg/dL 147    Triglycerides Latest Ref Range: 0 - 149 mg/dL 140    HDL Latest Ref Range: >=40 mg/dL 44    LDL Latest Ref Range: <100 mg/dL 75    Troponin T Latest Ref Range: 6 - 19 ng/L <6    INR Latest Ref Range: 0.87 - 1.13   1.04   PT Latest Ref Range: 12.0 - 14.6 sec  13.9       Radiology:   MRI brain August 24, 2020 at [facility]:  There are small areas of acute infarcts in the left inferior cerebellar hemisphere in the distribution of left posterior inferior cerebellar artery. There are changes secondary to the endovascular repair of left vertebral artery aneurysm. The flow voids   of the intracranial left vertebral and posterior inferior cerebellar arteries are patent.    CTA head August 24, 2020 at [facility]  Status post intracranial distal left vertebral artery aneurysm coil embolization with no evidence of recurrence or adjacent vascular occlusion     CT angiogram of the [place] within normal limits.    CTA neck August 24, 2020 at [facility]:  Normal extracranial vertebral arteries     Mild carotid plaque without significant stenosis or aneurysm     Paraseptal emphysema    Interventional radiology procedure August 19, 2020 at [facility]:  1.  Unruptured approximately 4 x 2.5 mm sized distal intracranial vertebral artery aneurysm.  2.  Successful endovascular repair- coiling.    CTA head on August 4, 2020 at [facility]:  1.  No large vessel occlusion identified.  2.  4.5 mm aneurysm of the vertebrobasilar junction,  referral to neuroradiology aneurysm clipping for further evaluation and management.      CTA neck August 4, 2020 at [facility]:  1.  CT angiogram of the neck within normal limits.    CT head w/o July 25, 2020 at [facility]:  1.  Head CT without contrast within normal limits. No evidence of acute cerebral infarction, hemorrhage or mass lesion.    Current Outpatient Medications   Medication Sig Dispense Refill   • meclizine (ANTIVERT) 12.5 MG Tab Take 1 Tab by mouth 2 times a day as needed for Dizziness. 10 Tab 0   • atorvastatin (LIPITOR) 40 MG Tab Take 1 Tab by mouth every day. 30 Tab 3   • aspirin (ASA) 81 MG Chew Tab chewable tablet Take 81 mg by mouth every day.     • lisinopril (PRINIVIL) 10 MG Tab Take 1 Tab by mouth every day. 30 Tab 0     No current facility-administered medications for this encounter.        No Known Allergies    Physical Exam   Constitutional: He is oriented to person, place, and time and well-developed, well-nourished, and in no distress. No distress.   HENT:   Head: Normocephalic.   Eyes: No scleral icterus.   Pulmonary/Chest: Effort normal. No respiratory distress.   Abdominal: He exhibits no distension.   Neurological: He is alert and oriented to person, place, and time. He has normal sensation and normal strength. He is not agitated and not disoriented. He displays no weakness, no tremor, facial symmetry, normal stance and normal speech. No cranial nerve deficit. Gait normal. Coordination and gait normal.   Skin: Skin is warm and dry. No rash noted. He is not diaphoretic. No erythema. No pallor.   Psychiatric: Mood, memory, affect and judgment normal.     Impression:   1. Unruptured 4.5 mm vertebrobasilar aneurysm, status post coil embolization.  2. Hypertension, poorly controlled.  3. Tobacco dependence.  4. Left cerebellar stroke.     Plan:   Yahir Cohen MD has reviewed 's history and imaging studies, examined the patient, and discussed treatment options.  has  recovered well from the procedure and interval stroke. We expect his symptoms to continue to improve. There is a chance that the aneurysm could recur. We explained that the patient will need to have ongoing surveillance imaging after the procedure, which will be managed by us, and that future treatment depends on multiple factors including lab studies, imaging, and performance status. His next imaging study will be a catheter angiogram in 1 year. We encouraged his strict blood pressure control and commended his complete tobacco cessation. We also directed him to discuss the statin therapy with his PCP as the stroke was a thrombotic event related to the coiling and not from atherosclerotic disease. He should continue the aspirin 81 mg daily for 6 months.     FLAVIO Zarate with Yahir Cohen MD  Neuro Interventional Service   [address] ([address])  [place] [address]  [phone]

## 2020-09-15 ENCOUNTER — HOSPITAL ENCOUNTER (OUTPATIENT)
Dept: RADIOLOGY | Facility: MEDICAL CENTER | Age: 55
End: 2020-09-15
Attending: NURSE PRACTITIONER
Payer: MEDICAID

## 2020-09-15 DIAGNOSIS — I67.1 CEREBRAL ANEURYSM: ICD-10-CM

## 2020-09-15 ASSESSMENT — ENCOUNTER SYMPTOMS
NAUSEA: 0
DIZZINESS: 1

## 2020-09-15 ASSESSMENT — LIFESTYLE VARIABLES: SUBSTANCE_ABUSE: 0

## 2020-11-06 NOTE — PROGRESS NOTES
Monitor summary per monitor tech:    Rhythm: sinus rhythm  Rate: 68  Ectopy: no ectopy  Measurements: .16/.08/.4    yes

## 2020-12-21 NOTE — PROGRESS NOTES
Pt contacted APRN regarding ongoing symptoms after stroke 8/2020. He continues to have dizziness that waxes and wanes, is OK for a couple of days and then returns. Feels lightheaded all the time. Is still taking meclizine and aspirin. Symptoms from stroke have never really resolved.     Just completed medications for a diverticulitis recurrence 10 days ago.These cause nausea and unintentional weight loss. In addition, his chronic neck and back pain is severe and constant. Has eye pain with head position. Expresses frustration with his decline in health over the past 2 years and poor quality of life as he is unable to be active.     After discussion, pt also has call pending to his PCP. Has scheduled appt for next month. Suggested pt try to move his appt up sooner. May also benefit from neurology evaluation.     Above neurologic complaints are not acute, however they are not improving as expected. Will discuss with Dr. Cohen upon his return to [place].

## 2021-01-07 DIAGNOSIS — I67.1 CEREBRAL ANEURYSM: ICD-10-CM

## 2021-01-13 ENCOUNTER — HOSPITAL ENCOUNTER (OUTPATIENT)
Dept: RADIOLOGY | Facility: MEDICAL CENTER | Age: 56
End: 2021-01-13
Attending: NURSE PRACTITIONER
Payer: MEDICAID

## 2021-01-13 DIAGNOSIS — I67.1 CEREBRAL ANEURYSM: ICD-10-CM

## 2021-01-13 NOTE — PROGRESS NOTES
Telephone Appointment Visit   As a means of avoiding spread of COVID-19, this visit is being conducted by telephone. This telephone visit was initiated by the patient and they verbally consented. Yahir Cohen MD present for entirety of telehealth visit.    Time at start of call: 1120  Reason for Call:  Symptom Follow-up    HPI:    Pt contacted APRN regarding ongoing symptoms after stroke 8/2020. He is status post VB aneurysm coiling 8/19/20 and presented with symtpoms of nausea and dizziness from cerebellar stroke on August 24. Imaging revealed an occluded left PICA, likely the result of the VB aneurysm intervention in close proximity to the arterial origin. He has been on aspirin monotherapy. He has contacted APRN because he continues to have dizziness that waxes and wanes, is OK for a couple of days and then returns. Feels lightheaded all the time. He is still taking meclizine and aspirin. He does not think the symptoms from stroke have ever really resolved. 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 He indicates that prior to the aneurysm intervention, he had other nonspecific symptoms from his neck, such as headaches and dizziness as well as eye pain from head position. He discloses recent health decline in the past two years due to the above complaints as well as recurring diverticulitis. He has close follow up with his PCP.      Labs / Images Reviewed:   MRI brain August 24, 2020 at [facility]:  There are small areas of acute infarcts in the left inferior cerebellar hemisphere in the distribution of left posterior inferior cerebellar artery. There are changes secondary to the endovascular repair of left vertebral artery aneurysm. The flow voids   of the intracranial left vertebral and posterior inferior cerebellar arteries are patent.    CTA head August 24, 2020 at [facility]  Status post intracranial distal left vertebral artery aneurysm coil embolization with no evidence of recurrence or  adjacent vascular occlusion     CT angiogram of the [place] within normal limits.         Interventional radiology procedure August 19, 2020 at [facility]:  1.  Unruptured approximately 4 x 2.5 mm sized distal intracranial vertebral artery aneurysm.  2.  Successful endovascular repair- coiling.      Assessment and Plan:   Impression:   1. Unruptured 4.5 mm vertebrobasilar aneurysm, status post coil embolization.  2. Hypertension, poorly controlled.  3. Tobacco dependence.  4. Left cerebellar stroke.  5. Dizziness.     Recommendations: Stroke recovery discussed with patient and explained that we expect his symptoms to continue to improve. The dizziness symptoms he describes are consistent with stroke location. It can take up to a year for maximum recovery results. Continue meclizine as prescribed. Continue good blood pressure control.   Follow-up: catheter angiogram in August 2021. Continue aspirin for at least 6 months post intervention.    Time at end of call: 1128  Total Time Spent: 5-10 minutes    SCOT Reid.

## 2021-07-27 ENCOUNTER — PRE-ADMISSION TESTING (OUTPATIENT)
Dept: ADMISSIONS | Facility: MEDICAL CENTER | Age: 56
End: 2021-07-27
Attending: RADIOLOGY
Payer: MEDICAID

## 2021-07-27 RX ORDER — POTASSIUM CHLORIDE 750 MG/1
1 CAPSULE, EXTENDED RELEASE ORAL PRN
COMMUNITY
Start: 2021-06-24

## 2021-07-27 RX ORDER — CYPROHEPTADINE HYDROCHLORIDE 4 MG/1
1 TABLET ORAL PRN
Status: ON HOLD | COMMUNITY
Start: 2021-07-23 | End: 2021-08-02

## 2021-07-27 RX ORDER — ERGOCALCIFEROL 1.25 MG/1
1 CAPSULE ORAL
COMMUNITY
Start: 2021-06-23

## 2021-07-27 RX ORDER — FUROSEMIDE 20 MG/1
1 TABLET ORAL PRN
COMMUNITY
Start: 2021-06-24

## 2021-07-27 RX ORDER — TESTOSTERONE CYPIONATE 200 MG/ML
1 INJECTION, SOLUTION INTRAMUSCULAR
COMMUNITY
Start: 2021-07-26

## 2021-07-27 RX ORDER — LEVETIRACETAM 500 MG/1
500 TABLET ORAL 2 TIMES DAILY
COMMUNITY

## 2021-07-30 ENCOUNTER — APPOINTMENT (OUTPATIENT)
Dept: ADMISSIONS | Facility: MEDICAL CENTER | Age: 56
End: 2021-07-30
Attending: RADIOLOGY
Payer: MEDICAID

## 2021-07-30 DIAGNOSIS — Z01.812 PRE-OPERATIVE LABORATORY EXAMINATION: ICD-10-CM

## 2021-07-30 LAB
ERYTHROCYTE [DISTWIDTH] IN BLOOD BY AUTOMATED COUNT: 47 FL (ref 35.9–50)
HCT VFR BLD AUTO: 45.8 % (ref 42–52)
HGB BLD-MCNC: 15.2 G/DL (ref 14–18)
INR PPP: 1.11 (ref 0.87–1.13)
MCH RBC QN AUTO: 31.3 PG (ref 27–33)
MCHC RBC AUTO-ENTMCNC: 33.2 G/DL (ref 33.7–35.3)
MCV RBC AUTO: 94.4 FL (ref 81.4–97.8)
PLATELET # BLD AUTO: 207 K/UL (ref 164–446)
PMV BLD AUTO: 10.1 FL (ref 9–12.9)
PROTHROMBIN TIME: 14 SEC (ref 12–14.6)
RBC # BLD AUTO: 4.85 M/UL (ref 4.7–6.1)
WBC # BLD AUTO: 4.8 K/UL (ref 4.8–10.8)

## 2021-07-30 PROCEDURE — 36415 COLL VENOUS BLD VENIPUNCTURE: CPT

## 2021-07-30 PROCEDURE — 85610 PROTHROMBIN TIME: CPT

## 2021-07-30 PROCEDURE — 85027 COMPLETE CBC AUTOMATED: CPT

## 2021-08-02 ENCOUNTER — HOSPITAL ENCOUNTER (OUTPATIENT)
Facility: MEDICAL CENTER | Age: 56
End: 2021-08-02
Attending: RADIOLOGY | Admitting: RADIOLOGY
Payer: MEDICAID

## 2021-08-02 ENCOUNTER — APPOINTMENT (OUTPATIENT)
Dept: RADIOLOGY | Facility: MEDICAL CENTER | Age: 56
End: 2021-08-02
Attending: RADIOLOGY
Payer: MEDICAID

## 2021-08-02 VITALS
SYSTOLIC BLOOD PRESSURE: 128 MMHG | WEIGHT: 217.59 LBS | HEIGHT: 73 IN | HEART RATE: 58 BPM | RESPIRATION RATE: 16 BRPM | DIASTOLIC BLOOD PRESSURE: 66 MMHG | TEMPERATURE: 96.9 F | BODY MASS INDEX: 28.84 KG/M2 | OXYGEN SATURATION: 94 %

## 2021-08-02 DIAGNOSIS — I67.1 INTRACRANIAL ANEURYSM: ICD-10-CM

## 2021-08-02 PROCEDURE — 160002 HCHG RECOVERY MINUTES (STAT)

## 2021-08-02 PROCEDURE — 99153 MOD SED SAME PHYS/QHP EA: CPT

## 2021-08-02 PROCEDURE — 700117 HCHG RX CONTRAST REV CODE 255: Performed by: RADIOLOGY

## 2021-08-02 PROCEDURE — 700111 HCHG RX REV CODE 636 W/ 250 OVERRIDE (IP)

## 2021-08-02 RX ORDER — OXYCODONE HYDROCHLORIDE 5 MG/1
2.5 TABLET ORAL
Status: DISCONTINUED | OUTPATIENT
Start: 2021-08-02 | End: 2021-08-02 | Stop reason: HOSPADM

## 2021-08-02 RX ORDER — MIDAZOLAM HYDROCHLORIDE 1 MG/ML
.5-2 INJECTION INTRAMUSCULAR; INTRAVENOUS PRN
Status: DISCONTINUED | OUTPATIENT
Start: 2021-08-02 | End: 2021-08-02 | Stop reason: HOSPADM

## 2021-08-02 RX ORDER — OXYCODONE HYDROCHLORIDE 5 MG/1
5 TABLET ORAL
Status: DISCONTINUED | OUTPATIENT
Start: 2021-08-02 | End: 2021-08-02 | Stop reason: HOSPADM

## 2021-08-02 RX ORDER — SODIUM CHLORIDE 9 MG/ML
500 INJECTION, SOLUTION INTRAVENOUS
Status: DISCONTINUED | OUTPATIENT
Start: 2021-08-02 | End: 2021-08-02 | Stop reason: HOSPADM

## 2021-08-02 RX ORDER — ATORVASTATIN CALCIUM 40 MG/1
40 TABLET, FILM COATED ORAL NIGHTLY
COMMUNITY

## 2021-08-02 RX ORDER — HYDROMORPHONE HYDROCHLORIDE 1 MG/ML
0.25 INJECTION, SOLUTION INTRAMUSCULAR; INTRAVENOUS; SUBCUTANEOUS
Status: DISCONTINUED | OUTPATIENT
Start: 2021-08-02 | End: 2021-08-02 | Stop reason: HOSPADM

## 2021-08-02 RX ORDER — ONDANSETRON 2 MG/ML
4 INJECTION INTRAMUSCULAR; INTRAVENOUS PRN
Status: DISCONTINUED | OUTPATIENT
Start: 2021-08-02 | End: 2021-08-02 | Stop reason: HOSPADM

## 2021-08-02 RX ORDER — MIDAZOLAM HYDROCHLORIDE 1 MG/ML
INJECTION INTRAMUSCULAR; INTRAVENOUS
Status: COMPLETED
Start: 2021-08-02 | End: 2021-08-02

## 2021-08-02 RX ORDER — VERAPAMIL HYDROCHLORIDE 2.5 MG/ML
INJECTION, SOLUTION INTRAVENOUS
Status: COMPLETED
Start: 2021-08-02 | End: 2021-08-02

## 2021-08-02 RX ORDER — HEPARIN SODIUM 1000 [USP'U]/ML
INJECTION, SOLUTION INTRAVENOUS; SUBCUTANEOUS
Status: COMPLETED
Start: 2021-08-02 | End: 2021-08-02

## 2021-08-02 RX ADMIN — VERAPAMIL HYDROCHLORIDE 2.5 MG: 2.5 INJECTION, SOLUTION INTRAVENOUS at 10:49

## 2021-08-02 RX ADMIN — MIDAZOLAM HYDROCHLORIDE 2 MG: 1 INJECTION, SOLUTION INTRAMUSCULAR; INTRAVENOUS at 10:47

## 2021-08-02 RX ADMIN — MIDAZOLAM HYDROCHLORIDE 2 MG: 1 INJECTION INTRAMUSCULAR; INTRAVENOUS at 10:47

## 2021-08-02 RX ADMIN — HEPARIN SODIUM 2000 UNITS: 1000 INJECTION, SOLUTION INTRAVENOUS; SUBCUTANEOUS at 10:49

## 2021-08-02 RX ADMIN — FENTANYL CITRATE 25 MCG: 50 INJECTION, SOLUTION INTRAMUSCULAR; INTRAVENOUS at 10:58

## 2021-08-02 RX ADMIN — IOHEXOL 50 ML: 300 INJECTION, SOLUTION INTRAVENOUS at 12:45

## 2021-08-02 RX ADMIN — NITROGLYCERIN 200 MCG: 20 INJECTION INTRAVENOUS at 10:49

## 2021-08-02 ASSESSMENT — FIBROSIS 4 INDEX: FIB4 SCORE: .8260869565217391304

## 2021-08-02 ASSESSMENT — PAIN DESCRIPTION - PAIN TYPE: TYPE: ACUTE PAIN

## 2021-08-02 NOTE — PROGRESS NOTES
IR RN note    Reviewed sedation orders with MD  Patient underwent a Cerebral Angiogram  by Dr. Cohen.  Procedure site was verified by MD using imaging guidance. Consent was signed.  MALATHI Baker and Ross assisted. Patient was placed in a supine position.  Vitals were taken every 5 minutes and remained stable during procedure (see doc flow sheet for results).  CO2 waveform capnography was monitored throughout procedure, see chart.  Right Radial access site.   A TR Band was placed over surgical site. Report called to Christine OMER. Pt transported by pati with RN to PACU.     11 mL in TR Band on right radial access site.

## 2021-08-02 NOTE — OR NURSING
1129 Patient arrived to PACU.  Report from RN.  Right radial TR band site clean, dry and soft.  Updated on plan of care, verbalized understanding.  1155 1 ml air removed from TR band, no bleeding to site.  1200 Patient's wife called and updated on patient status.  1210 2 ml air removed from TR band, no bleeding to site.  1222 2 ml air removed from TR band, no bleeding to site.  1236 2 ml air removed from TR band, no bleeding to site.  Patient resting in gurney, denies discomfort.  1246 Final 2 ml air removed from TR band, no bleeding to site.  1255 TR band removed, dressing placed.  1258 Report given to Heydi OMER.  Wife called and updated on patient status.  1302 Patient transferred to Phase 2.

## 2021-08-02 NOTE — OR SURGEON
Immediate Post- Operative Note        PostOp Diagnosis: S/P endovascualr repair of left PICA aneurym      Procedure(s): Diagnostic cerebral angiogram      Estimated Blood Loss: Less than 5 ml        Complications: None            8/2/2021     11:29 AM     Yahir Cohen M.D.

## 2021-08-02 NOTE — DISCHARGE INSTRUCTIONS
ACTIVITY: Rest and take it easy for the first 24 hours.  A responsible adult is recommended to remain with you during that time.  It is normal to feel sleepy.  We encourage you to not do anything that requires balance, judgment or coordination.    MILD FLU-LIKE SYMPTOMS ARE NORMAL. YOU MAY EXPERIENCE GENERALIZED MUSCLE ACHES, THROAT IRRITATION, HEADACHE AND/OR SOME NAUSEA.    FOR 24 HOURS DO NOT:  Drive, operate machinery or run household appliances.  Drink beer or alcoholic beverages.   Make important decisions or sign legal documents.    SPECIAL INSTRUCTIONS: Cerebral Angiogram, Care After    What can I expect after the procedure?  After your procedure, it is common to have:  · Bruising and tenderness at the catheter insertion site.  · A mild headache.  Follow these instructions at home:  Insertion site care  No Lifting more than 3-5 pounds with affected wrist for 5 days  · Follow instructions from your health care provider about how to take care of the insertion site. Make sure you:  ? Wash your hands with soap and water before you change your bandage (dressing). If soap and water are not available, use hand .  ? Change your dressing as told by your health care provider.  ? Leave stitches (sutures), skin glue, or adhesive strips in place. These skin closures may need to stay in place for 2 weeks or longer. If adhesive strip edges start to loosen and curl up, you may trim the loose edges. Do not remove adhesive strips completely unless your health care provider tells you to do that.  · Do not apply powder or lotion to the site.  · Do not take baths, swim, or use a hot tub until your health care provider says it is okay to do so.  · You may shower 24-48 hours after the procedure or as told by your health care provider.  ? Gently wash the site with plain soap and water.  ? Pat the area dry with a clean towel.  ? Do not rub the site. This may cause bleeding.  · Check your incision area every day for signs  of infection. Check for:  ? Redness, swelling, or pain.  ? Fluid or blood.  ? Warmth.  ? Pus or a bad smell.  Activity  · Rest as told by your health care provider.  · Do not lift anything that is heavier than 10 lb (4.5 kg), or the limit that your health care provider tells you, until he or she says that it is safe.  · Do not drive for 24 hours if you were given a medicine to help you relax (sedative).  · Do not drive or use heavy machinery while taking prescription pain medicine.  General instructions  · Return to your normal activities as told by your health care provider. Ask your health care provider what activities are safe for you.  · If the catheter site starts to bleed, lie flat and put pressure on the site.  · Drink enough fluid to keep your urine clear or pale yellow. This helps flush the contrast dye from your body.  · Take over-the-counter and prescription medicines only as told by your health care provider.  · Keep all follow-up visits as directed by your health care provider. This is important.  Contact a health care provider if:  · You have a fever or chills.  · You have redness, swelling, or more pain around your insertion site.  · You have fluid or blood coming from your insertion site.  · The insertion site feels warm to the touch.  · You have pus or a bad smell coming from your insertion site.  · You have more bruising around the insertion site.  · Blood collects in the tissue around the catheter site (hematoma), and the hematoma may be painful to the touch.  Get help right away if:  · You have vision changes or loss of vision.  · The catheter insertion area swells very fast.  · You have numbness or weakness on one side of your body.  · You have trouble talking, or you have slurred speech or cannot speak (aphasia).  · You feel confused or have trouble remembering.  · You have severe pain at the catheter insertion area.  · The catheter insertion area is bleeding, and the bleeding does not stop  after 30 minutes of holding steady pressure on the site.  · The arm or leg where the catheter was inserted is numb, tingling, or cold.  · You have chest pain.  · You have trouble breathing.  · You have a rash.  · You have trouble using the arm or leg where the catheter was inserted.  These symptoms may represent a serious problem that is an emergency. Do not wait to see if the symptoms will go away. Get medical help right away. Call your local emergency services (911 in [place] Do not drive yourself to the hospital.  Summary  · After your procedure, it is common to have bruising and tenderness at the site where the catheter was inserted.  · If your catheter insertion site begins to bleed, put direct pressure on it until the bleeding stops.  · After your procedure, it is important to rest and drink plenty of fluids.  · Return to your normal activities as told by your health care provider. Ask your health care provider what activities are safe for you.    DIET: To avoid nausea, slowly advance diet as tolerated, avoiding spicy or greasy foods for the first day.  Add more substantial food to your diet according to your physician's instructions.  Babies can be fed formula or breast milk as soon as they are hungry.  INCREASE FLUIDS AND FIBER TO AVOID CONSTIPATION.    SURGICAL DRESSING/BATHING: Keep dressing dry for 24 hours. May remove dressing and shower after 1pm on 8/3, do not need to replace dressing. Do not submerge in water or bath for 7 days.     FOLLOW-UP APPOINTMENT:  A follow-up appointment should be arranged with your doctor in 1 week with Dr. Cohen [phone]; call to schedule.    You should CALL YOUR PHYSICIAN if you develop:  Fever greater than 101 degrees F.  Pain not relieved by medication, or persistent nausea or vomiting.  Excessive bleeding (blood soaking through dressing) or unexpected drainage from the wound.  Extreme redness or swelling around the incision site, drainage of pus or foul smelling  drainage.  Inability to urinate or empty your bladder within 8 hours.  Problems with breathing or chest pain.    You should call 911 if you develop problems with breathing or chest pain.  If you are unable to contact your doctor or surgical center, you should go to the nearest emergency room or urgent care center.  Physician's telephone #: [phone]    If any questions arise, call your doctor.  If your doctor is not available, please feel free to call the Surgical Center at [phone]. The Contact Center is open Monday through Friday 7AM to 5PM and may speak to a nurse at [phone], or toll free at [phone].     A registered nurse may call you a few days after your surgery to see how you are doing after your procedure.    MEDICATIONS: Resume taking daily medication.  Take prescribed pain medication with food.  If no medication is prescribed, you may take non-aspirin pain medication if needed.  PAIN MEDICATION CAN BE VERY CONSTIPATING.  Take a stool softener or laxative such as senokot, pericolace, or milk of magnesia if needed.    If your physician has prescribed pain medication that includes Acetaminophen (Tylenol), do not take additional Acetaminophen (Tylenol) while taking the prescribed medication.    Depression / Suicide Risk    As [facility] are [facility], it is important to learn how to keep safe from harming yourself.    Recognize the warning signs:  · Abrupt changes in personality, positive or negative- including increase in energy   · Giving away possessions  · Change in eating patterns- significant weight changes-  positive or negative  · Change in sleeping patterns- unable to sleep or sleeping all the time   · Unwillingness or inability to communicate  · Depression  · Unusual sadness, discouragement and loneliness  · Talk of wanting to die  · Neglect of personal appearance   · Rebelliousness- reckless behavior  · Withdrawal from people/activities they  love  · Confusion- inability to concentrate    If you or a loved one observes any of these behaviors or has concerns about self-harm, here's what you can do:  · Talk about it- your feelings and reasons for harming yourself  · Remove any means that you might use to hurt yourself (examples: pills, rope, extension cords, firearm)  · Get professional help from the community (Mental Health, Substance Abuse, psychological counseling)  · Do not be alone:Call your Safe Contact- someone whom you trust who will be there for you.  · Call your local CRISIS HOTLINE 286-0415 or 817-773-1540  · Call your local Children's Mobile Crisis Response Team [place] (910) 305-6905 or www.[employer]  · Call the toll free National Suicide Prevention Hotlines   · National Suicide Prevention Lifeline 507-017-JMNL (2890)  · National Hope Line Network 800-SUICIDE (714-7634)

## 2021-08-02 NOTE — H&P
History and Physical    Date: 2021    PCP: Corby Villavicencio D.O.          HPI: This is a 56 y.o. male who is  S/P left PICA aneurysm coiling.    Past Medical History:   Diagnosis Date   • Arthritis    • Dental disorder     bottom teeth need dental work    • Heart burn    • High cholesterol    • Hypertension    • Pain 2020    neck pain    • Seizure (HCC)     after mini stroke   • Spinal stenosis     back and neck   • Stroke (HCC)        Past Surgical History:   Procedure Laterality Date   • OTHER NEUROLOGICAL SURG      coil placement   • BOWEL RESECTION     • ANKLE ORIF Left    • BOWEL RESECTION         No current facility-administered medications for this encounter.        Social History     Socioeconomic History   • Marital status:      Spouse name: Not on file   • Number of children: Not on file   • Years of education: Not on file   • Highest education level: Not on file   Occupational History   • Not on file   Tobacco Use   • Smoking status: Former Smoker     Packs/day: 0.25     Years: 40.00     Pack years: 10.00     Types: Cigarettes     Quit date: 8/10/2020     Years since quittin.9   • Smokeless tobacco: Former User     Types: Chew   Vaping Use   • Vaping Use: Never used   Substance and Sexual Activity   • Alcohol use: Never   • Drug use: Never   • Sexual activity: Not on file   Other Topics Concern   • Not on file   Social History Narrative   • Not on file     Social Determinants of Health     Financial Resource Strain: Low Risk    • Difficulty of Paying Living Expenses: Not hard at all   Food Insecurity: No Food Insecurity   • Worried About Running Out of Food in the Last Year: Never true   • Ran Out of Food in the Last Year: Never true   Transportation Needs: No Transportation Needs   • Lack of Transportation (Medical): No   • Lack of Transportation (Non-Medical): No   Physical Activity:    • Days of Exercise per Week:    • Minutes of Exercise per Session:    Stress:    •  Feeling of Stress :    Social Connections:    • Frequency of Communication with Friends and Family:    • Frequency of Social Gatherings with Friends and Family:    • Attends Taoism Services:    • Active Member of Clubs or Organizations:    • Attends Club or Organization Meetings:    • Marital Status:    Intimate Partner Violence:    • Fear of Current or Ex-Partner:    • Emotionally Abused:    • Physically Abused:    • Sexually Abused:        History reviewed. No pertinent family history.    Allergies:  Patient has no known allergies.    Review of Systems:  PICA     Physical Exam:    Alert and oriented     No neurological deficts    Vital Signs  Blood Pressure: 141/82   Temperature: 36.5 °C (97.7 °F)   Pulse: 61   Respiration: 18   Pulse Oximetry: 97 %        Labs:  Recent Labs     07/30/21  1519   WBC 4.8   RBC 4.85   HEMOGLOBIN 15.2   HEMATOCRIT 45.8   MCV 94.4   MCH 31.3   MCHC 33.2*   RDW 47.0   PLATELETCT 207   MPV 10.1         Recent Labs     07/30/21  1519   INR 1.11     Recent Labs     07/30/21  1519   INR 1.11       Radiology:  IR-CAROTID-CEREBRAL BILATERAL    (Results Pending)             Assessment and Plan:This is a 56 y.o. who is s/p left PICA aneurym    Plan:diagnostic cerebral angiogram

## 2021-08-02 NOTE — OR NURSING
1309 Pt arrived to Phase II with PACU RN. AAOx4. VSS. Denies pain and nausea. Right radial dressing is CDI and soft. CMS intact. Pt denies numbness/tingling. Belongings returned to pt bedside. POC updated with pt and with family member over the phone.    1315 Pt given discharge instructions. Discussed diet, activity, follow-up, symptoms and management, and prescriptions provided. IV d/c'd. All questions answered.      1320 Pt discharged to family via wheelchair with CNA. All belongings with pt.

## 2022-02-08 NOTE — ED TRIAGE NOTES
Chief Complaint   Patient presents with   • Dizziness     and nausea. Sudden onset at 0800 this am   • Neck Pain     sudden onset this morning. 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     Transfer from [facility] for above. ERP at bedside on arrival and taken to CT scan with RN.    Adequate: hears normal conversation without difficulty

## 2022-07-22 ENCOUNTER — TELEPHONE (OUTPATIENT)
Dept: SCHEDULING | Facility: IMAGING CENTER | Age: 57
End: 2022-07-22

## 2022-07-22 ENCOUNTER — HOSPITAL ENCOUNTER (OUTPATIENT)
Dept: RADIOLOGY | Facility: MEDICAL CENTER | Age: 57
End: 2022-07-22
Attending: NURSE PRACTITIONER
Payer: MEDICARE

## 2022-07-22 DIAGNOSIS — I67.1 INTRACRANIAL ANEURYSM: ICD-10-CM

## 2022-07-22 PROCEDURE — 70544 MR ANGIOGRAPHY HEAD W/O DYE: CPT | Mod: MH

## 2022-07-25 NOTE — PROGRESS NOTES
MRA reviewed with Dr. Cohen. No evidence of recurrent left PICA/ VB junction aneurysm. No new findings.     Pt contacted by phone, results relayed. Recommend MRA in 1 year. We will call him to arrange this study. He is pleased with the results and in agreement with surveillance plan.

## 2022-12-13 NOTE — PROGRESS NOTES
Pt contacted FLAVIO stating his insurance is no longer accepted at [facility], has moved appt to [facility]. Has had subsequent MRA head studies at [facility] after coiling procedure but new facility is requesting implant card for aneurysm coils.   Explained to pt that companies do not make cards for coils but sent his procedure report to [facility] at fax number he provided .

## 2022-12-15 ENCOUNTER — APPOINTMENT (OUTPATIENT)
Dept: RADIOLOGY | Facility: MEDICAL CENTER | Age: 57
End: 2022-12-15
Attending: PHYSICIAN ASSISTANT
Payer: MEDICARE

## 2023-09-22 NOTE — PROGRESS NOTES
Pt contacted APRN with request for how to obtain medical records. Has moved to [place], has his coil implant information for upcoming MRA. Pt instructed to use [facility] through his new office to obtain necessary records from facility. All questions answered.

## 2024-08-05 NOTE — PROGRESS NOTES
IR Nursing Note    Intracranial embolization with poss stent placement by MD Cohen assisted by RT Jenny/Macrina,  access site.      Right femoral access site, sealed with angioseal at 1255, covered with gauze, tegerdem.      Left vertebral artery:  HydroSoft 10 2.5mm x 4 cm     REF# [id number]  LOT# [id number]   HydroSoft 10 1.5mm x 2 cm   REF# [id number]  LOT# [id number]      Anesthesia by MD De La Garza . Patient appeared tolerated procedure, report given to KAN Gregg.  Patient transported to PACU via IR RN and MD monitored then transferred care to report RN.       [phone]